# Patient Record
Sex: MALE | Race: WHITE | Employment: OTHER | URBAN - METROPOLITAN AREA
[De-identification: names, ages, dates, MRNs, and addresses within clinical notes are randomized per-mention and may not be internally consistent; named-entity substitution may affect disease eponyms.]

---

## 2017-02-21 ENCOUNTER — OFFICE VISIT (OUTPATIENT)
Dept: INTERNAL MEDICINE CLINIC | Facility: CLINIC | Age: 82
End: 2017-02-21

## 2017-02-21 VITALS
HEIGHT: 70 IN | TEMPERATURE: 98 F | BODY MASS INDEX: 31.92 KG/M2 | HEART RATE: 55 BPM | OXYGEN SATURATION: 97 % | SYSTOLIC BLOOD PRESSURE: 150 MMHG | DIASTOLIC BLOOD PRESSURE: 80 MMHG | RESPIRATION RATE: 18 BRPM | WEIGHT: 223 LBS

## 2017-02-21 DIAGNOSIS — I10 HTN, GOAL BELOW 130/80: ICD-10-CM

## 2017-02-21 DIAGNOSIS — Z87.39 HISTORY OF GOUT: ICD-10-CM

## 2017-02-21 DIAGNOSIS — E83.42 HYPOMAGNESEMIA: ICD-10-CM

## 2017-02-21 DIAGNOSIS — E11.9 DIABETES MELLITUS TYPE 2 IN NONOBESE (HCC): Primary | ICD-10-CM

## 2017-02-21 DIAGNOSIS — Z90.5 STATUS POST NEPHRECTOMY: ICD-10-CM

## 2017-02-21 DIAGNOSIS — H54.40 BLIND RIGHT EYE: ICD-10-CM

## 2017-02-21 DIAGNOSIS — E78.5 DYSLIPIDEMIA: ICD-10-CM

## 2017-02-21 LAB
EST. AVERAGE GLUCOSE BLD GHB EST-MCNC: 143 MG/DL (ref 68–126)
HAV IGM SER QL: 1.7 MG/DL (ref 1.7–3)
HBA1C MFR BLD HPLC: 6.6 % (ref ?–5.7)

## 2017-02-21 PROCEDURE — 83036 HEMOGLOBIN GLYCOSYLATED A1C: CPT | Performed by: INTERNAL MEDICINE

## 2017-02-21 PROCEDURE — G0439 PPPS, SUBSEQ VISIT: HCPCS | Performed by: INTERNAL MEDICINE

## 2017-02-21 PROCEDURE — 36415 COLL VENOUS BLD VENIPUNCTURE: CPT | Performed by: INTERNAL MEDICINE

## 2017-02-21 PROCEDURE — 83735 ASSAY OF MAGNESIUM: CPT | Performed by: INTERNAL MEDICINE

## 2017-02-21 RX ORDER — FENOFIBRATE 145 MG/1
145 TABLET, COATED ORAL DAILY
Qty: 90 TABLET | Refills: 3 | Status: SHIPPED | OUTPATIENT
Start: 2017-02-21 | End: 2018-03-29 | Stop reason: DRUGHIGH

## 2017-02-21 RX ORDER — ENALAPRIL MALEATE 5 MG/1
5 TABLET ORAL DAILY
Qty: 180 TABLET | Refills: 3 | Status: SHIPPED | OUTPATIENT
Start: 2017-02-21 | End: 2018-04-30

## 2017-02-21 NOTE — PATIENT INSTRUCTIONS
Deny Stanley's SCREENING SCHEDULE   Tests on this list are recommended by your physician but may not be covered, or covered at this frequency, by your insurer. Please check with your insurance carrier before scheduling to verify coverage.    PREVENTATIV Activity if applicable    Zoster (Not covered by Medicare Part B) No orders found for this or any previous visit. Update Immunization Activity if applicable    Deirdre Adams, we are increasing her blood pressure medication. Enalapril were taken 2.5 mg.   Now take 2

## 2017-02-21 NOTE — PROGRESS NOTES
Eulogio Chavez is a 80year old male who presents for a Medicare Annual Wellness visit.          Patient Care Team: Patient Care Team:  Aileen Barbour MD as PCP - General (Internal Medicine)    Patient Active Problem List:     HTN, goal below 130/80 Rfl: 3     Wt Readings from Last 6 Encounters:  02/21/17 : 223 lb  07/14/16 : 221 lb  01/05/16 : 217 lb  10/08/15 : 215 lb  09/23/15 : 216 lb 3.2 oz  09/08/15 : 216 lb    Body mass index is 32 kg/(m^2).       Lab Results  Component Value Date   GLU 98 07/14 aspirin?: Yes    Have you had any immunizations at another office such as Influenza, Hepatitis B, Tetanus, or Pneumococcal?: Yes     Functional Ability     Bathing or Showering: Able without help    Toileting: Able without help    Dressing: Able without he \"Flag\": Correct    Recall \"Tree\": Correct         PREVENTATIVE SERVICES  INDICATIONS AND SCHEDULE Internal Lab or Procedure External Lab or Procedure   Diabetes Screening      HbgA1C   Annually HEMOGLOBIN A1C (%)   Date Value   04/07/2015 6.3*   02/02/ Internal Lab or Procedure External Lab or Procedure   Annual Monitoring of Persistent     Medications (ACE/ARB, digoxin, diuretics)    Potassium  Annually POTASSIUM (mmol/L)   Date Value   07/14/2016 4.3   07/12/2014 4.1    No flowsheet data found.     Crea Test sugars once daily Disp: 100 each Rfl: 12   FreeStyle Lancets Does not apply Misc Test sugars once daily Disp: 100 each Rfl: 12   OMEPRAZOLE 20 MG OR CPDR Take  by mouth. One bid].   Disp:  Rfl:    TERAZOSIN HCL 10 MG OR CAPS 1 CAPSULE AT BEDTIME Disp: incontinence  MUSCULOSKELETAL: denies back pain  NEURO: denies headaches  PSYCHE: denies depression or anxiety  HEMATOLOGIC: denies hx of anemia  ENDOCRINE: denies thyroid history  ALL/ASTHMA: denies hx of allergy or asthma    EXAM:   /80 mmHg  Pulse Return office 1 month problem #3 dyslipidemia clinically stable #4 hypomagnesemia check level #5 artificial right eye #6 history of gout patient asymptomatic #7 status post nephrectomy secondary to complication of kidney stones  The patient indicates under

## 2017-03-31 ENCOUNTER — OFFICE VISIT (OUTPATIENT)
Dept: INTERNAL MEDICINE CLINIC | Facility: CLINIC | Age: 82
End: 2017-03-31

## 2017-03-31 VITALS
RESPIRATION RATE: 18 BRPM | BODY MASS INDEX: 32 KG/M2 | TEMPERATURE: 98 F | WEIGHT: 224.5 LBS | HEART RATE: 70 BPM | DIASTOLIC BLOOD PRESSURE: 66 MMHG | OXYGEN SATURATION: 96 % | SYSTOLIC BLOOD PRESSURE: 116 MMHG

## 2017-03-31 DIAGNOSIS — E78.5 DYSLIPIDEMIA: ICD-10-CM

## 2017-03-31 DIAGNOSIS — E11.9 DIABETES MELLITUS TYPE 2 IN NONOBESE (HCC): Primary | ICD-10-CM

## 2017-03-31 DIAGNOSIS — I10 HTN, GOAL BELOW 130/80: ICD-10-CM

## 2017-03-31 PROCEDURE — 99213 OFFICE O/P EST LOW 20 MIN: CPT | Performed by: INTERNAL MEDICINE

## 2017-03-31 RX ORDER — CLOBETASOL PROPIONATE 0.46 MG/ML
SOLUTION TOPICAL
Refills: 1 | COMMUNITY
Start: 2017-01-28 | End: 2018-11-06 | Stop reason: ALTCHOICE

## 2017-03-31 RX ORDER — POLYMYXIN B SULFATE AND TRIMETHOPRIM 1; 10000 MG/ML; [USP'U]/ML
SOLUTION OPHTHALMIC
Refills: 1 | COMMUNITY
Start: 2017-02-06 | End: 2018-03-29

## 2017-03-31 NOTE — PROGRESS NOTES
Patient is here follow up on his blood pressure. We did increase his blood pressure medication. He denies headaches dizziness chest pain or shortness of breath. Physical examination pleasant alert well orientated no acute distress.   Normocephalic yohannes

## 2017-04-24 ENCOUNTER — OFFICE VISIT (OUTPATIENT)
Dept: INTERNAL MEDICINE CLINIC | Facility: CLINIC | Age: 82
End: 2017-04-24

## 2017-04-24 VITALS
SYSTOLIC BLOOD PRESSURE: 140 MMHG | DIASTOLIC BLOOD PRESSURE: 72 MMHG | OXYGEN SATURATION: 97 % | BODY MASS INDEX: 32 KG/M2 | TEMPERATURE: 98 F | RESPIRATION RATE: 16 BRPM | HEART RATE: 58 BPM | WEIGHT: 223.63 LBS

## 2017-04-24 DIAGNOSIS — J06.9 URTI (ACUTE UPPER RESPIRATORY INFECTION): Primary | ICD-10-CM

## 2017-04-24 DIAGNOSIS — H10.022 PINK EYE, LEFT: ICD-10-CM

## 2017-04-24 PROCEDURE — 99213 OFFICE O/P EST LOW 20 MIN: CPT | Performed by: INTERNAL MEDICINE

## 2017-04-24 RX ORDER — ECHINACEA PURPUREA EXTRACT 125 MG
1 TABLET ORAL AS NEEDED
Qty: 1 BOTTLE | Refills: 0 | Status: SHIPPED | OUTPATIENT
Start: 2017-04-24 | End: 2018-03-29

## 2017-04-24 RX ORDER — CIPROFLOXACIN HYDROCHLORIDE 3.5 MG/ML
2 SOLUTION/ DROPS TOPICAL
Qty: 1 BOTTLE | Refills: 0 | Status: SHIPPED | OUTPATIENT
Start: 2017-04-24 | End: 2017-05-25 | Stop reason: ALTCHOICE

## 2017-04-24 RX ORDER — AMOXICILLIN 500 MG/1
500 CAPSULE ORAL 2 TIMES DAILY
Qty: 14 CAPSULE | Refills: 0 | Status: SHIPPED | OUTPATIENT
Start: 2017-04-24 | End: 2017-05-25 | Stop reason: ALTCHOICE

## 2017-04-24 NOTE — PROGRESS NOTES
For last 8 days Mikki's been complaining of pressure in his forehead head congestion. Denies sore throat earaches or fever. States left eye pink with morning discharge and itchy sensation.     Physical examination pleasant individual appears in no acute d

## 2017-05-25 ENCOUNTER — OFFICE VISIT (OUTPATIENT)
Dept: INTERNAL MEDICINE CLINIC | Facility: CLINIC | Age: 82
End: 2017-05-25

## 2017-05-25 VITALS
RESPIRATION RATE: 18 BRPM | DIASTOLIC BLOOD PRESSURE: 80 MMHG | TEMPERATURE: 97 F | OXYGEN SATURATION: 96 % | BODY MASS INDEX: 31 KG/M2 | HEART RATE: 62 BPM | SYSTOLIC BLOOD PRESSURE: 129 MMHG | WEIGHT: 217.81 LBS

## 2017-05-25 DIAGNOSIS — Z90.5 STATUS POST NEPHRECTOMY: ICD-10-CM

## 2017-05-25 DIAGNOSIS — E11.9 DIABETES MELLITUS TYPE 2 IN NONOBESE (HCC): Primary | ICD-10-CM

## 2017-05-25 DIAGNOSIS — E78.5 DYSLIPIDEMIA: ICD-10-CM

## 2017-05-25 DIAGNOSIS — I10 HTN, GOAL BELOW 130/80: ICD-10-CM

## 2017-05-25 PROCEDURE — 83036 HEMOGLOBIN GLYCOSYLATED A1C: CPT | Performed by: INTERNAL MEDICINE

## 2017-05-25 PROCEDURE — 80053 COMPREHEN METABOLIC PANEL: CPT | Performed by: INTERNAL MEDICINE

## 2017-05-25 PROCEDURE — 99214 OFFICE O/P EST MOD 30 MIN: CPT | Performed by: INTERNAL MEDICINE

## 2017-05-25 PROCEDURE — 85025 COMPLETE CBC W/AUTO DIFF WBC: CPT | Performed by: INTERNAL MEDICINE

## 2017-05-25 PROCEDURE — 80061 LIPID PANEL: CPT | Performed by: INTERNAL MEDICINE

## 2017-05-25 PROCEDURE — 36415 COLL VENOUS BLD VENIPUNCTURE: CPT | Performed by: INTERNAL MEDICINE

## 2017-05-25 NOTE — PROGRESS NOTES
Problem #1 diabetes mellitus. Patient denies polyuria polydipsia. Up-to-date on eye exam.  Does not measure home blood sugar. Denies any numbness or tingling in arms and legs.   Problem #2 hypertension patient denies headaches dizziness chest pain shortn

## 2017-05-26 DIAGNOSIS — N18.30 CKD (CHRONIC KIDNEY DISEASE) STAGE 3, GFR 30-59 ML/MIN (HCC): Primary | ICD-10-CM

## 2017-06-08 ENCOUNTER — OFFICE VISIT (OUTPATIENT)
Dept: INTERNAL MEDICINE CLINIC | Facility: CLINIC | Age: 82
End: 2017-06-08

## 2017-06-08 VITALS
SYSTOLIC BLOOD PRESSURE: 140 MMHG | OXYGEN SATURATION: 97 % | HEART RATE: 64 BPM | RESPIRATION RATE: 18 BRPM | BODY MASS INDEX: 32 KG/M2 | TEMPERATURE: 101 F | WEIGHT: 223.38 LBS | DIASTOLIC BLOOD PRESSURE: 60 MMHG

## 2017-06-08 DIAGNOSIS — J18.9 PNEUMONIA OF RIGHT MIDDLE LOBE DUE TO INFECTIOUS ORGANISM: Primary | ICD-10-CM

## 2017-06-08 PROCEDURE — 36415 COLL VENOUS BLD VENIPUNCTURE: CPT | Performed by: INTERNAL MEDICINE

## 2017-06-08 PROCEDURE — 99213 OFFICE O/P EST LOW 20 MIN: CPT | Performed by: INTERNAL MEDICINE

## 2017-06-08 RX ORDER — LEVOFLOXACIN 500 MG/1
500 TABLET, FILM COATED ORAL DAILY
Qty: 7 TABLET | Refills: 0 | Status: SHIPPED | OUTPATIENT
Start: 2017-06-08 | End: 2017-11-28 | Stop reason: ALTCHOICE

## 2017-06-08 NOTE — PATIENT INSTRUCTIONS
Problem if the situation deteriorates please call security have them evaluate you. I want to make an appointment to see me on Monday.

## 2017-06-12 ENCOUNTER — OFFICE VISIT (OUTPATIENT)
Dept: INTERNAL MEDICINE CLINIC | Facility: CLINIC | Age: 82
End: 2017-06-12

## 2017-06-12 VITALS
DIASTOLIC BLOOD PRESSURE: 70 MMHG | RESPIRATION RATE: 16 BRPM | HEART RATE: 60 BPM | BODY MASS INDEX: 32 KG/M2 | OXYGEN SATURATION: 97 % | WEIGHT: 220.63 LBS | SYSTOLIC BLOOD PRESSURE: 122 MMHG | TEMPERATURE: 98 F

## 2017-06-12 DIAGNOSIS — J18.9 PNEUMONIA OF RIGHT MIDDLE LOBE DUE TO INFECTIOUS ORGANISM: Primary | ICD-10-CM

## 2017-06-12 PROCEDURE — 99213 OFFICE O/P EST LOW 20 MIN: CPT | Performed by: INTERNAL MEDICINE

## 2017-06-12 NOTE — PROGRESS NOTES
Jerona Prader states for the first time today he feels better. Still coughing. No fever chills. Physical examination pleasant alert well orientated no acute distress. Lungs few crackles right bases. Skin warm and dry.     Impression pneumonia improved    Plan

## 2017-11-28 ENCOUNTER — OFFICE VISIT (OUTPATIENT)
Dept: INTERNAL MEDICINE CLINIC | Facility: CLINIC | Age: 82
End: 2017-11-28

## 2017-11-28 VITALS
RESPIRATION RATE: 18 BRPM | BODY MASS INDEX: 33 KG/M2 | WEIGHT: 221 LBS | OXYGEN SATURATION: 96 % | DIASTOLIC BLOOD PRESSURE: 80 MMHG | HEART RATE: 56 BPM | SYSTOLIC BLOOD PRESSURE: 153 MMHG | TEMPERATURE: 97 F

## 2017-11-28 DIAGNOSIS — T16.1XXA FOREIGN BODY OF RIGHT EAR, INITIAL ENCOUNTER: ICD-10-CM

## 2017-11-28 DIAGNOSIS — E78.5 DYSLIPIDEMIA: ICD-10-CM

## 2017-11-28 DIAGNOSIS — I10 HTN, GOAL BELOW 130/80: ICD-10-CM

## 2017-11-28 DIAGNOSIS — E11.22 TYPE 2 DIABETES MELLITUS WITH STAGE 3 CHRONIC KIDNEY DISEASE, WITHOUT LONG-TERM CURRENT USE OF INSULIN (HCC): ICD-10-CM

## 2017-11-28 DIAGNOSIS — N18.30 TYPE 2 DIABETES MELLITUS WITH STAGE 3 CHRONIC KIDNEY DISEASE, WITHOUT LONG-TERM CURRENT USE OF INSULIN (HCC): ICD-10-CM

## 2017-11-28 DIAGNOSIS — J01.11 ACUTE RECURRENT FRONTAL SINUSITIS: Primary | ICD-10-CM

## 2017-11-28 DIAGNOSIS — E83.42 HYPOMAGNESEMIA: ICD-10-CM

## 2017-11-28 DIAGNOSIS — E11.9 DIABETES MELLITUS TYPE 2 IN NONOBESE (HCC): ICD-10-CM

## 2017-11-28 PROCEDURE — 80061 LIPID PANEL: CPT | Performed by: INTERNAL MEDICINE

## 2017-11-28 PROCEDURE — 80053 COMPREHEN METABOLIC PANEL: CPT | Performed by: INTERNAL MEDICINE

## 2017-11-28 PROCEDURE — 99214 OFFICE O/P EST MOD 30 MIN: CPT | Performed by: INTERNAL MEDICINE

## 2017-11-28 PROCEDURE — 83036 HEMOGLOBIN GLYCOSYLATED A1C: CPT | Performed by: INTERNAL MEDICINE

## 2017-11-28 PROCEDURE — 83735 ASSAY OF MAGNESIUM: CPT | Performed by: INTERNAL MEDICINE

## 2017-11-28 PROCEDURE — 85025 COMPLETE CBC W/AUTO DIFF WBC: CPT | Performed by: INTERNAL MEDICINE

## 2017-11-28 PROCEDURE — 36415 COLL VENOUS BLD VENIPUNCTURE: CPT | Performed by: INTERNAL MEDICINE

## 2017-11-28 RX ORDER — SULFAMETHOXAZOLE AND TRIMETHOPRIM 800; 160 MG/1; MG/1
1 TABLET ORAL 2 TIMES DAILY
Qty: 14 TABLET | Refills: 0 | Status: SHIPPED | OUTPATIENT
Start: 2017-11-28 | End: 2018-01-03 | Stop reason: ALTCHOICE

## 2017-11-28 NOTE — PATIENT INSTRUCTIONS
Alvaro Huntley to check her blood pressure daily write the numbers down and make an appointment see me one month and bring those numbers with you.   We did give her referral to see a kidney specialist and also an ear nose and throat specialist to remove poss

## 2017-11-28 NOTE — PROGRESS NOTES
Problem 1 hypertension patient denies headaches dizziness chest pain or shortness of breath.   He did not take his blood pressure pills today problem #2 dyslipidemia denies abdominal pain no muscle aches and pains problem #3 diabetes mellitus with chronic k

## 2017-12-20 ENCOUNTER — OFFICE VISIT (OUTPATIENT)
Dept: NEPHROLOGY | Facility: CLINIC | Age: 82
End: 2017-12-20

## 2017-12-20 VITALS — BODY MASS INDEX: 33 KG/M2 | SYSTOLIC BLOOD PRESSURE: 156 MMHG | DIASTOLIC BLOOD PRESSURE: 82 MMHG | WEIGHT: 222 LBS

## 2017-12-20 DIAGNOSIS — I10 ESSENTIAL HYPERTENSION: ICD-10-CM

## 2017-12-20 DIAGNOSIS — N18.30 CKD (CHRONIC KIDNEY DISEASE) STAGE 3, GFR 30-59 ML/MIN (HCC): Primary | ICD-10-CM

## 2017-12-20 PROCEDURE — 99204 OFFICE O/P NEW MOD 45 MIN: CPT | Performed by: INTERNAL MEDICINE

## 2017-12-20 NOTE — PROGRESS NOTES
Nephrology Consult Note    REASON FOR CONSULT: CKD 3    ASSESSMENT/PLAN:        1) CKD 3- agree that slowly rising serum creatinine from 1.4 -> 1.6 mg/dl since 2011 is due to a combination of hypertensive and age-related nephrosclerosis superimposed on a s acute or chronic renal failure gross microscopic hematuria proteinuria or kidney infections. No history of cardiac pulmonary or hepatic disease.     ROS:    Denies fever/chills  Denies wt loss/gain  Denies HA or visual changes  Denies CP or palpitations  D spray by Nasal route as needed for congestion.  Disp: 1 Bottle Rfl: 0   Clobetasol Propionate 0.05 % External Solution APPLY TO ITCHY AREAS OF SCALP BID FOR UP TO 2 WEEKS THEN PRN FOR FLARES Disp:  Rfl: 1   Polymyxin B-Trimethoprim 58413-4.1 UNIT/ML-% Ophth distress. HEENT: No scleral icterus, MMM  Neck: Supple, no EDITA or thyromegaly  Cardiac: Regular rate and rhythm, S1, S2 normal, no murmur or rub  Lungs: Clear without wheezes, rales, rhonchi.     Abdomen: Soft, non-tender. + bowel sounds, no palpable organ

## 2017-12-21 ENCOUNTER — OFFICE VISIT (OUTPATIENT)
Dept: INTERNAL MEDICINE CLINIC | Facility: CLINIC | Age: 82
End: 2017-12-21

## 2017-12-21 VITALS
OXYGEN SATURATION: 97 % | DIASTOLIC BLOOD PRESSURE: 80 MMHG | HEART RATE: 60 BPM | WEIGHT: 221.5 LBS | BODY MASS INDEX: 33 KG/M2 | SYSTOLIC BLOOD PRESSURE: 144 MMHG | TEMPERATURE: 97 F | RESPIRATION RATE: 18 BRPM

## 2017-12-21 DIAGNOSIS — I10 HTN, GOAL BELOW 130/80: Primary | ICD-10-CM

## 2017-12-21 DIAGNOSIS — N18.30 CKD (CHRONIC KIDNEY DISEASE) STAGE 3, GFR 30-59 ML/MIN (HCC): ICD-10-CM

## 2017-12-21 DIAGNOSIS — E78.1 HIGH TRIGLYCERIDES: ICD-10-CM

## 2017-12-21 PROCEDURE — 99214 OFFICE O/P EST MOD 30 MIN: CPT | Performed by: INTERNAL MEDICINE

## 2017-12-21 NOTE — PROGRESS NOTES
Problem #1 hypertension controlled denies any headaches dizziness chest pain shortness of breath. He has been taking multiple blood pressure readings and just 5 of them have been elevated in the 740 range systolic.   Malu Caballero however refuses to take more medic

## 2018-01-03 ENCOUNTER — OFFICE VISIT (OUTPATIENT)
Dept: INTERNAL MEDICINE CLINIC | Facility: CLINIC | Age: 83
End: 2018-01-03

## 2018-01-03 VITALS
OXYGEN SATURATION: 95 % | HEIGHT: 69.5 IN | BODY MASS INDEX: 31.92 KG/M2 | TEMPERATURE: 98 F | SYSTOLIC BLOOD PRESSURE: 122 MMHG | RESPIRATION RATE: 16 BRPM | HEART RATE: 72 BPM | WEIGHT: 220.44 LBS | DIASTOLIC BLOOD PRESSURE: 60 MMHG

## 2018-01-03 DIAGNOSIS — R10.11 RIGHT UPPER QUADRANT ABDOMINAL PAIN: Primary | ICD-10-CM

## 2018-01-03 PROCEDURE — 99214 OFFICE O/P EST MOD 30 MIN: CPT | Performed by: INTERNAL MEDICINE

## 2018-01-03 NOTE — PROGRESS NOTES
Because complaining of what he calls at times pain is at times discomfort right upper quadrant. Intermittent pain no known aggravating or relieving events. May last an hour sometimes it may last all day.   States appetite is good bowel movements normal no

## 2018-02-12 ENCOUNTER — OFFICE VISIT (OUTPATIENT)
Dept: INTERNAL MEDICINE CLINIC | Facility: CLINIC | Age: 83
End: 2018-02-12

## 2018-02-12 VITALS
DIASTOLIC BLOOD PRESSURE: 60 MMHG | HEART RATE: 61 BPM | TEMPERATURE: 99 F | BODY MASS INDEX: 32 KG/M2 | SYSTOLIC BLOOD PRESSURE: 126 MMHG | OXYGEN SATURATION: 96 % | WEIGHT: 223 LBS | RESPIRATION RATE: 16 BRPM

## 2018-02-12 DIAGNOSIS — B34.9 VIRAL SYNDROME: ICD-10-CM

## 2018-02-12 PROCEDURE — 99213 OFFICE O/P EST LOW 20 MIN: CPT | Performed by: INTERNAL MEDICINE

## 2018-02-12 NOTE — PATIENT INSTRUCTIONS
[place] Diet  Your healthcare provider may recommend a bland diet if you have an upset stomach. It consists of foods that are mild and easy to digest. It is better to eat small frequent meals rather than 3 large meals a day.     Beverages  OK: Fruit juices, © 3905-3640 [employer] [address]. [address] [facility] [place], [facility] [place]. All rights reserved. This information is not intended as a substitute for professional medical care. Always follow your healthcare professional's instructions.       Antonio etienne

## 2018-02-12 NOTE — PROGRESS NOTES
Malu Caballero not been feeling well last couple days. Yesterday started with nausea vomiting and diarrhea today just has diarrhea. States he is better no fever chills. No cough shortness of breath or wheezing.     Physical examination pleasant individual in no ac

## 2018-03-29 ENCOUNTER — OFFICE VISIT (OUTPATIENT)
Dept: INTERNAL MEDICINE CLINIC | Facility: CLINIC | Age: 83
End: 2018-03-29

## 2018-03-29 VITALS
RESPIRATION RATE: 18 BRPM | TEMPERATURE: 97 F | HEART RATE: 56 BPM | SYSTOLIC BLOOD PRESSURE: 145 MMHG | OXYGEN SATURATION: 96 % | BODY MASS INDEX: 32 KG/M2 | WEIGHT: 223.13 LBS | DIASTOLIC BLOOD PRESSURE: 80 MMHG

## 2018-03-29 DIAGNOSIS — J22 LRTI (LOWER RESPIRATORY TRACT INFECTION): Primary | ICD-10-CM

## 2018-03-29 DIAGNOSIS — I10 ELEVATED BLOOD PRESSURE READING IN OFFICE WITH DIAGNOSIS OF HYPERTENSION: ICD-10-CM

## 2018-03-29 PROCEDURE — 99214 OFFICE O/P EST MOD 30 MIN: CPT | Performed by: INTERNAL MEDICINE

## 2018-03-29 RX ORDER — AZITHROMYCIN 500 MG/1
500 TABLET, FILM COATED ORAL DAILY
Qty: 1 TABLET | Refills: 0 | Status: SHIPPED | OUTPATIENT
Start: 2018-03-29 | End: 2018-05-14 | Stop reason: ALTCHOICE

## 2018-03-29 RX ORDER — IPRATROPIUM BROMIDE 42 UG/1
2 SPRAY, METERED NASAL 2 TIMES DAILY PRN
COMMUNITY

## 2018-03-29 RX ORDER — FENOFIBRATE 145 MG/1
145 TABLET, COATED ORAL DAILY
COMMUNITY
End: 2018-05-30

## 2018-03-29 NOTE — PATIENT INSTRUCTIONS
Ara Hernandez want to check her blood pressure daily write those numbers down and make appointment in 1 month and bring those numbers with you.

## 2018-03-29 NOTE — PROGRESS NOTES
Patient's been complaining of chest congestion cough for over a week. No headaches dizziness chest pain shortness of breath. Physical examination pleasant individual in no acute distress. Normocephalic nonicteric.   Carotids 2+ no bruits no thyromegaly

## 2018-04-30 ENCOUNTER — OFFICE VISIT (OUTPATIENT)
Dept: INTERNAL MEDICINE CLINIC | Facility: CLINIC | Age: 83
End: 2018-04-30

## 2018-04-30 VITALS
HEART RATE: 66 BPM | WEIGHT: 223.63 LBS | HEIGHT: 69.5 IN | BODY MASS INDEX: 32.38 KG/M2 | SYSTOLIC BLOOD PRESSURE: 160 MMHG | DIASTOLIC BLOOD PRESSURE: 80 MMHG | TEMPERATURE: 98 F | OXYGEN SATURATION: 97 % | RESPIRATION RATE: 16 BRPM

## 2018-04-30 DIAGNOSIS — E11.69 DM TYPE 2 WITH DIABETIC DYSLIPIDEMIA (HCC): ICD-10-CM

## 2018-04-30 DIAGNOSIS — J06.9 VIRAL UPPER RESPIRATORY TRACT INFECTION: ICD-10-CM

## 2018-04-30 DIAGNOSIS — E11.22 TYPE 2 DIABETES MELLITUS WITH STAGE 3 CHRONIC KIDNEY DISEASE, WITHOUT LONG-TERM CURRENT USE OF INSULIN (HCC): ICD-10-CM

## 2018-04-30 DIAGNOSIS — Z90.5 STATUS POST NEPHRECTOMY: ICD-10-CM

## 2018-04-30 DIAGNOSIS — I10 HTN, GOAL BELOW 130/80: ICD-10-CM

## 2018-04-30 DIAGNOSIS — E83.42 HYPOMAGNESEMIA: ICD-10-CM

## 2018-04-30 DIAGNOSIS — E11.9 DIABETES MELLITUS TYPE 2 IN NONOBESE (HCC): Primary | ICD-10-CM

## 2018-04-30 DIAGNOSIS — Z00.00 ENCOUNTER FOR ANNUAL HEALTH EXAMINATION: ICD-10-CM

## 2018-04-30 DIAGNOSIS — E78.5 DM TYPE 2 WITH DIABETIC DYSLIPIDEMIA (HCC): ICD-10-CM

## 2018-04-30 DIAGNOSIS — N18.30 TYPE 2 DIABETES MELLITUS WITH STAGE 3 CHRONIC KIDNEY DISEASE, WITHOUT LONG-TERM CURRENT USE OF INSULIN (HCC): ICD-10-CM

## 2018-04-30 DIAGNOSIS — H54.40 BLIND RIGHT EYE: ICD-10-CM

## 2018-04-30 PROBLEM — E78.1 HIGH TRIGLYCERIDES: Status: RESOLVED | Noted: 2017-12-21 | Resolved: 2018-04-30

## 2018-04-30 PROCEDURE — 36415 COLL VENOUS BLD VENIPUNCTURE: CPT | Performed by: INTERNAL MEDICINE

## 2018-04-30 PROCEDURE — G0439 PPPS, SUBSEQ VISIT: HCPCS | Performed by: INTERNAL MEDICINE

## 2018-04-30 PROCEDURE — 93000 ELECTROCARDIOGRAM COMPLETE: CPT | Performed by: INTERNAL MEDICINE

## 2018-04-30 PROCEDURE — 83735 ASSAY OF MAGNESIUM: CPT | Performed by: INTERNAL MEDICINE

## 2018-04-30 PROCEDURE — 83036 HEMOGLOBIN GLYCOSYLATED A1C: CPT | Performed by: INTERNAL MEDICINE

## 2018-04-30 RX ORDER — ENALAPRIL MALEATE 10 MG/1
10 TABLET ORAL DAILY
Qty: 180 TABLET | Refills: 3 | Status: SHIPPED | OUTPATIENT
Start: 2018-04-30 | End: 2018-11-16

## 2018-04-30 NOTE — PROGRESS NOTES
HPI:   Padma Corado is a 80year old male who presents for a Medicare Subsequent Annual Wellness visit (Pt already had Initial Annual Wellness).     URI  His last annual assessment has been over 1 year: Annual Physical due on 02/21/2018         Fall/Risk A General (Internal Medicine)    Patient Active Problem List:     HTN, goal below 130/80     Diabetes mellitus type 2 in nonobese [facility])     Blind right eye     Status post nephrectomy left complication  stone     Type 2 diabetes mellitus with stage 3 chronic PM)   aspirin 81 MG Oral Tab Take two tablets nightly    Psyllium (METAMUCIL OR) 2 (two) times daily.    Glucose Blood (FREESTYLE INSULINX TEST) In Vitro Strip Test sugars once daily   FreeStyle Lancets Does not apply Misc Test sugars once daily   OMEPRAZOL Ht 69.5\"   Wt 223 lb 9.6 oz   SpO2 97%   BMI 32.55 kg/m²   Estimated body mass index is 32.55 kg/m² as calculated from the following:    Height as of this encounter: 69.5\". Weight as of this encounter: 223 lb 9.6 oz.     Medicare Hearing Assessment  ( • Pneumovax 23 02/06/2009   • TDAP 03/21/2013   • Zoster Vaccine Live (Zostavax) 02/06/2009        ASSESSMENT AND OTHER RELEVANT CHRONIC CONDITIONS:   Anusha Snow is a 80year old male who presents for a Medicare Assessment.      PLAN SUMMARY:   Diagnos Date Value   04/07/2015 6.3 (H)     HgbA1C (%)   Date Value   11/28/2017 6.3 (H)       No flowsheet data found.     Fasting Blood Sugar (FSB)Annually   Glucose (mg/dL)   Date Value   11/28/2017 101 (H)   04/07/2015 123 (H)   ----------    Cardiovascular D covered with a cut with metal- TD and TDaP Not covered by Medicare Part B) No vaccine history found This may be covered with your prescription benefits, but Medicare does not cover unless Medically needed    Zoster   Not covered by Medicare Part B No vacci

## 2018-04-30 NOTE — PATIENT INSTRUCTIONS
Jose Woo work and increase your enalapril 5 mg twice a day to 10 mg twice a day. You currently have 5 mg tablets to take 2 tablets twice a day. We will give you a prescription for 10 mg twice a day he could take to the [place].   Also regarding your respiratory tr 11/28/2017 55     CHOLESTEROL, TOTAL (mg/dL)   Date Value   07/11/2014 119     Cholesterol, Total (mg/dL)   Date Value   11/28/2017 105     Triglycerides (mg/dL)   Date Value   11/28/2017 100        EKG - covered if needed at Welcome to Medicare, and non 09/29/14  -FLU VAC NO PRSV 4 JONATHAN 3 YRS+    Please get every year    Pneumococcal 13 (Prevnar)  Covered Once after 65   Orders placed or performed in visit on 12/23/14  -PNEUMOCOCCAL VACC, 13 JONATHAN IM    Please get once after your 65th birthday    Jessy Sinclair

## 2018-05-14 ENCOUNTER — OFFICE VISIT (OUTPATIENT)
Dept: INTERNAL MEDICINE CLINIC | Facility: CLINIC | Age: 83
End: 2018-05-14

## 2018-05-14 VITALS
SYSTOLIC BLOOD PRESSURE: 138 MMHG | WEIGHT: 215.88 LBS | RESPIRATION RATE: 16 BRPM | TEMPERATURE: 98 F | OXYGEN SATURATION: 96 % | BODY MASS INDEX: 31 KG/M2 | HEART RATE: 63 BPM | DIASTOLIC BLOOD PRESSURE: 60 MMHG

## 2018-05-14 DIAGNOSIS — J01.10 ACUTE NON-RECURRENT FRONTAL SINUSITIS: Primary | ICD-10-CM

## 2018-05-14 PROCEDURE — 99213 OFFICE O/P EST LOW 20 MIN: CPT | Performed by: INTERNAL MEDICINE

## 2018-05-14 RX ORDER — AMOXICILLIN AND CLAVULANATE POTASSIUM 500; 125 MG/1; MG/1
1 TABLET, FILM COATED ORAL 3 TIMES DAILY
Qty: 21 TABLET | Refills: 0 | Status: SHIPPED | OUTPATIENT
Start: 2018-05-14 | End: 2018-05-30

## 2018-05-14 RX ORDER — SACCHAROMYCES BOULARDII 250 MG
250 CAPSULE ORAL 2 TIMES DAILY
Qty: 60 CAPSULE | Refills: 0 | Status: SHIPPED | OUTPATIENT
Start: 2018-05-14 | End: 2020-03-04

## 2018-05-14 NOTE — PATIENT INSTRUCTIONS
Licha Graft usually steam heat at least 3-4 times a day to loosen up at congestion or sinus. If any point in time symptoms deteriorate come down see us in after hours called EMTs.

## 2018-05-14 NOTE — PROGRESS NOTES
For last couple weeks Tone's had symptoms of sinus infection. He was on Z-Ayan with no benefit. He states he has seen a ENT recently and they were able to help him. He is not interested in second visit with an ENT.   He does complain of feeling tired fati

## 2018-05-21 ENCOUNTER — OFFICE VISIT (OUTPATIENT)
Dept: INTERNAL MEDICINE CLINIC | Facility: CLINIC | Age: 83
End: 2018-05-21

## 2018-05-21 VITALS
HEART RATE: 51 BPM | BODY MASS INDEX: 32 KG/M2 | RESPIRATION RATE: 16 BRPM | OXYGEN SATURATION: 98 % | DIASTOLIC BLOOD PRESSURE: 70 MMHG | TEMPERATURE: 98 F | SYSTOLIC BLOOD PRESSURE: 138 MMHG | WEIGHT: 217.69 LBS

## 2018-05-21 DIAGNOSIS — J01.10 ACUTE NON-RECURRENT FRONTAL SINUSITIS: ICD-10-CM

## 2018-05-21 DIAGNOSIS — B00.9 HERPES SIMPLEX: ICD-10-CM

## 2018-05-21 DIAGNOSIS — R05.9 COUGH: Primary | ICD-10-CM

## 2018-05-21 PROCEDURE — 99214 OFFICE O/P EST MOD 30 MIN: CPT | Performed by: INTERNAL MEDICINE

## 2018-05-21 RX ORDER — AMOXICILLIN AND CLAVULANATE POTASSIUM 500; 125 MG/1; MG/1
1 TABLET, FILM COATED ORAL 3 TIMES DAILY
Qty: 10 TABLET | Refills: 0 | Status: SHIPPED | OUTPATIENT
Start: 2018-05-21 | End: 2018-05-30

## 2018-05-21 NOTE — PROGRESS NOTES
Problem #1 sinusitis. Is completing a week's worth of Augmentin. Feels better but not 100%. No complaint of blisters in his lips. Does have history of symptom complex herpes. Also complains of dry cough. No fever chills or night sweats.   Patient did

## 2018-05-21 NOTE — PATIENT INSTRUCTIONS
Raymond Art we did order chest x-ray for you. We were unable to do the CT scan to check for possible lung cancer because her age.   Also wants to continue using the steam.  And I will see you morning

## 2018-05-23 ENCOUNTER — HOSPITAL ENCOUNTER (OUTPATIENT)
Dept: GENERAL RADIOLOGY | Age: 83
Discharge: HOME | End: 2018-05-23
Attending: INTERNAL MEDICINE
Payer: MEDICARE

## 2018-05-23 DIAGNOSIS — R05.9 COUGH: ICD-10-CM

## 2018-05-23 PROCEDURE — 71046 X-RAY EXAM CHEST 2 VIEWS: CPT | Performed by: INTERNAL MEDICINE

## 2018-05-24 NOTE — PROGRESS NOTES
Printed and placed in patients [facility].  V/O Dr Pietro Medina

## 2018-05-30 ENCOUNTER — OFFICE VISIT (OUTPATIENT)
Dept: INTERNAL MEDICINE CLINIC | Facility: CLINIC | Age: 83
End: 2018-05-30

## 2018-05-30 VITALS
TEMPERATURE: 98 F | RESPIRATION RATE: 18 BRPM | DIASTOLIC BLOOD PRESSURE: 78 MMHG | SYSTOLIC BLOOD PRESSURE: 134 MMHG | OXYGEN SATURATION: 96 % | BODY MASS INDEX: 32 KG/M2 | HEART RATE: 58 BPM | WEIGHT: 217.19 LBS

## 2018-05-30 DIAGNOSIS — J22 LRTI (LOWER RESPIRATORY TRACT INFECTION): Primary | ICD-10-CM

## 2018-05-30 DIAGNOSIS — E78.5 DM TYPE 2 WITH DIABETIC DYSLIPIDEMIA (HCC): ICD-10-CM

## 2018-05-30 DIAGNOSIS — E11.69 DM TYPE 2 WITH DIABETIC DYSLIPIDEMIA (HCC): ICD-10-CM

## 2018-05-30 PROCEDURE — 99213 OFFICE O/P EST LOW 20 MIN: CPT | Performed by: INTERNAL MEDICINE

## 2018-05-30 RX ORDER — FENOFIBRATE 145 MG/1
145 TABLET, COATED ORAL DAILY
Qty: 90 TABLET | Refills: 1 | Status: ON HOLD | OUTPATIENT
Start: 2018-05-30 | End: 2019-10-18

## 2018-05-30 NOTE — PROGRESS NOTES
Kristal Burt is here to follow-up on his lower respiratory tract infection. He has a long history of chemical exposures and his frequent sinus is respiratory tract infection. He now states after a course of Augmentin feels much better.   No cough shortness of lisha

## 2018-08-21 ENCOUNTER — TELEPHONE (OUTPATIENT)
Dept: INTERNAL MEDICINE CLINIC | Facility: CLINIC | Age: 83
End: 2018-08-21

## 2018-08-21 RX ORDER — AZITHROMYCIN 250 MG/1
TABLET, FILM COATED ORAL
Qty: 6 TABLET | Refills: 0 | Status: SHIPPED | OUTPATIENT
Start: 2018-08-21 | End: 2018-11-06 | Stop reason: ALTCHOICE

## 2018-11-06 ENCOUNTER — OFFICE VISIT (OUTPATIENT)
Dept: INTERNAL MEDICINE CLINIC | Facility: CLINIC | Age: 83
End: 2018-11-06
Payer: MEDICARE

## 2018-11-06 VITALS
HEIGHT: 69.24 IN | WEIGHT: 217.63 LBS | SYSTOLIC BLOOD PRESSURE: 156 MMHG | DIASTOLIC BLOOD PRESSURE: 72 MMHG | BODY MASS INDEX: 31.87 KG/M2 | HEART RATE: 73 BPM | OXYGEN SATURATION: 97 % | RESPIRATION RATE: 16 BRPM

## 2018-11-06 DIAGNOSIS — N52.9 ERECTILE DYSFUNCTION, UNSPECIFIED ERECTILE DYSFUNCTION TYPE: ICD-10-CM

## 2018-11-06 DIAGNOSIS — I10 ELEVATED BLOOD PRESSURE READING IN OFFICE WITH DIAGNOSIS OF HYPERTENSION: Primary | ICD-10-CM

## 2018-11-06 DIAGNOSIS — E66.3 PATIENT OVERWEIGHT: ICD-10-CM

## 2018-11-06 PROCEDURE — 99214 OFFICE O/P EST MOD 30 MIN: CPT | Performed by: INTERNAL MEDICINE

## 2018-11-06 RX ORDER — MAGNESIUM OXIDE 420 MG
1 TABLET ORAL 2 TIMES DAILY
COMMUNITY

## 2018-11-06 RX ORDER — SILDENAFIL 50 MG/1
50 TABLET, FILM COATED ORAL
Qty: 8 TABLET | Refills: 11 | Status: ON HOLD | OUTPATIENT
Start: 2018-11-06 | End: 2019-10-18

## 2018-11-06 NOTE — PATIENT INSTRUCTIONS
Tone increase your enalapril from 10 mg daily to 20 mg daily. Continue checking and writing down your blood pressure once a day.   Bring those numbers with you in 1 month

## 2018-11-06 NOTE — PROGRESS NOTES
Problem 1 hypertension. Patient denies headaches dizziness chest pain or shortness of breath. Home readings were reviewed and mostly were elevated. Problem #2 overweight. Patient has been losing weight.   He is cut back on in fact he has eliminated ann-marie

## 2018-11-16 ENCOUNTER — TELEPHONE (OUTPATIENT)
Dept: INTERNAL MEDICINE CLINIC | Facility: CLINIC | Age: 83
End: 2018-11-16

## 2018-11-16 RX ORDER — VALACYCLOVIR HYDROCHLORIDE 1 G/1
1 TABLET, FILM COATED ORAL EVERY 12 HOURS SCHEDULED
Qty: 14 TABLET | Status: SHIPPED | OUTPATIENT
Start: 2018-11-16 | End: 2018-11-23

## 2018-11-16 RX ORDER — ENALAPRIL MALEATE 20 MG/1
20 TABLET ORAL 2 TIMES DAILY
Qty: 20 TABLET | Refills: 0 | Status: ON HOLD | OUTPATIENT
Start: 2018-11-16 | End: 2019-10-18

## 2018-12-13 ENCOUNTER — OFFICE VISIT (OUTPATIENT)
Dept: INTERNAL MEDICINE CLINIC | Facility: CLINIC | Age: 83
End: 2018-12-13
Payer: MEDICARE

## 2018-12-13 VITALS
BODY MASS INDEX: 31 KG/M2 | DIASTOLIC BLOOD PRESSURE: 66 MMHG | WEIGHT: 209.31 LBS | HEART RATE: 61 BPM | RESPIRATION RATE: 18 BRPM | TEMPERATURE: 97 F | OXYGEN SATURATION: 97 % | SYSTOLIC BLOOD PRESSURE: 134 MMHG

## 2018-12-13 DIAGNOSIS — R63.4 WEIGHT LOSS: ICD-10-CM

## 2018-12-13 DIAGNOSIS — I10 ESSENTIAL HYPERTENSION: Primary | ICD-10-CM

## 2018-12-13 DIAGNOSIS — M25.562 ACUTE PAIN OF LEFT KNEE: ICD-10-CM

## 2018-12-13 PROCEDURE — 99214 OFFICE O/P EST MOD 30 MIN: CPT | Performed by: INTERNAL MEDICINE

## 2018-12-13 NOTE — PROGRESS NOTES
Problem 1 hypertension much improved. We did increase his lisinopril from 10-20 mg. He denies any chest pain shortness of breath. Patient says he has lost some weight however he states he has cut back on snacking.   We did inform him that if he continues

## 2019-02-07 ENCOUNTER — NURSE ONLY (OUTPATIENT)
Dept: INTERNAL MEDICINE CLINIC | Facility: CLINIC | Age: 84
End: 2019-02-07
Payer: MEDICARE

## 2019-02-07 ENCOUNTER — LAB ENCOUNTER (OUTPATIENT)
Dept: LAB | Age: 84
End: 2019-02-07
Attending: FAMILY MEDICINE
Payer: MEDICARE

## 2019-02-07 DIAGNOSIS — R53.81 CHRONIC FATIGUE AND MALAISE: Primary | ICD-10-CM

## 2019-02-07 DIAGNOSIS — R53.81 MALAISE AND FATIGUE: Primary | ICD-10-CM

## 2019-02-07 DIAGNOSIS — R53.82 CHRONIC FATIGUE AND MALAISE: Primary | ICD-10-CM

## 2019-02-07 DIAGNOSIS — M89.9 DISEASE OF BONE: ICD-10-CM

## 2019-02-07 DIAGNOSIS — R53.83 MALAISE AND FATIGUE: Primary | ICD-10-CM

## 2019-02-07 DIAGNOSIS — M89.9 DISORDER OF BONE: ICD-10-CM

## 2019-02-07 LAB
BASOPHILS # BLD AUTO: 0.02 X10(3) UL (ref 0–0.2)
BASOPHILS NFR BLD AUTO: 0.4 %
DEPRECATED RDW RBC AUTO: 45.5 FL (ref 35.1–46.3)
EOSINOPHIL # BLD AUTO: 0.3 X10(3) UL (ref 0–0.7)
EOSINOPHIL NFR BLD AUTO: 6.3 %
ERYTHROCYTE [DISTWIDTH] IN BLOOD BY AUTOMATED COUNT: 13.6 % (ref 11–15)
HCT VFR BLD AUTO: 41.7 % (ref 39–53)
HGB BLD-MCNC: 13.9 G/DL (ref 13–17.5)
IMM GRANULOCYTES # BLD AUTO: 0.01 X10(3) UL (ref 0–1)
IMM GRANULOCYTES NFR BLD: 0.2 %
LYMPHOCYTES # BLD AUTO: 1.06 X10(3) UL (ref 1–4)
LYMPHOCYTES NFR BLD AUTO: 22.2 %
MCH RBC QN AUTO: 30.4 PG (ref 26–34)
MCHC RBC AUTO-ENTMCNC: 33.3 G/DL (ref 31–37)
MCV RBC AUTO: 91.2 FL (ref 80–100)
MONOCYTES # BLD AUTO: 0.68 X10(3) UL (ref 0.1–1)
MONOCYTES NFR BLD AUTO: 14.2 %
NEUTROPHILS # BLD AUTO: 2.71 X10 (3) UL (ref 1.5–7.7)
NEUTROPHILS # BLD AUTO: 2.71 X10(3) UL (ref 1.5–7.7)
NEUTROPHILS NFR BLD AUTO: 56.7 %
PLATELET # BLD AUTO: 227 10(3)UL (ref 150–450)
RBC # BLD AUTO: 4.57 X10(6)UL (ref 3.8–5.8)
VIT D+METAB SERPL-MCNC: 47 NG/ML (ref 30–100)
WBC # BLD AUTO: 4.8 X10(3) UL (ref 4–11)

## 2019-02-07 PROCEDURE — 36415 COLL VENOUS BLD VENIPUNCTURE: CPT | Performed by: INTERNAL MEDICINE

## 2019-02-07 PROCEDURE — 85025 COMPLETE CBC W/AUTO DIFF WBC: CPT

## 2019-02-07 PROCEDURE — 82306 VITAMIN D 25 HYDROXY: CPT

## 2019-02-08 NOTE — TELEPHONE ENCOUNTER
Patient informed that Dr. Elva Hancock would like to see the patient before refilling refill request.    Patient states that he was seen on 12/13/18 and has a 3 month follow up appt on 3/14/19 as recommended. Future appt:     Your appointments     Date & Time Rohan

## 2019-03-14 ENCOUNTER — OFFICE VISIT (OUTPATIENT)
Dept: INTERNAL MEDICINE CLINIC | Facility: CLINIC | Age: 84
End: 2019-03-14
Payer: MEDICARE

## 2019-03-14 VITALS
BODY MASS INDEX: 29.86 KG/M2 | RESPIRATION RATE: 17 BRPM | HEART RATE: 78 BPM | TEMPERATURE: 98 F | DIASTOLIC BLOOD PRESSURE: 64 MMHG | HEIGHT: 69.24 IN | OXYGEN SATURATION: 95 % | WEIGHT: 203.88 LBS | SYSTOLIC BLOOD PRESSURE: 134 MMHG

## 2019-03-14 DIAGNOSIS — I10 ESSENTIAL HYPERTENSION: ICD-10-CM

## 2019-03-14 DIAGNOSIS — E11.9 DIABETES MELLITUS TYPE 2 IN NONOBESE (HCC): Primary | ICD-10-CM

## 2019-03-14 LAB
EST. AVERAGE GLUCOSE BLD GHB EST-MCNC: 126 MG/DL (ref 68–126)
HBA1C MFR BLD HPLC: 6 % (ref ?–5.7)

## 2019-03-14 PROCEDURE — 99214 OFFICE O/P EST MOD 30 MIN: CPT | Performed by: INTERNAL MEDICINE

## 2019-03-14 PROCEDURE — 83036 HEMOGLOBIN GLYCOSYLATED A1C: CPT | Performed by: INTERNAL MEDICINE

## 2019-03-14 RX ORDER — AZITHROMYCIN 250 MG/1
TABLET, FILM COATED ORAL
Qty: 6 TABLET | Refills: 0 | Status: SHIPPED | OUTPATIENT
Start: 2019-03-14 | End: 2019-05-20 | Stop reason: ALTCHOICE

## 2019-03-14 NOTE — PROGRESS NOTES
Problem 1 diabetes mellitus patient denies any polyuria dysuria hematuria. Up-to-date on eye exam.  Problem 2 hypertension he denies headaches dizziness chest pain or shortness of breath. Patient is requesting a prescription for Z-Ayan.   He is going on ex

## 2019-05-20 ENCOUNTER — OFFICE VISIT (OUTPATIENT)
Dept: INTERNAL MEDICINE CLINIC | Facility: CLINIC | Age: 84
End: 2019-05-20
Payer: MEDICARE

## 2019-05-20 VITALS
RESPIRATION RATE: 16 BRPM | SYSTOLIC BLOOD PRESSURE: 135 MMHG | HEART RATE: 55 BPM | HEIGHT: 70.13 IN | TEMPERATURE: 98 F | OXYGEN SATURATION: 97 % | BODY MASS INDEX: 29.01 KG/M2 | DIASTOLIC BLOOD PRESSURE: 70 MMHG | WEIGHT: 202.63 LBS

## 2019-05-20 DIAGNOSIS — E11.9 DIABETES MELLITUS TYPE 2 IN NONOBESE (HCC): ICD-10-CM

## 2019-05-20 DIAGNOSIS — E11.22 TYPE 2 DIABETES MELLITUS WITH STAGE 3 CHRONIC KIDNEY DISEASE, WITHOUT LONG-TERM CURRENT USE OF INSULIN (HCC): Primary | ICD-10-CM

## 2019-05-20 DIAGNOSIS — N18.30 TYPE 2 DIABETES MELLITUS WITH STAGE 3 CHRONIC KIDNEY DISEASE, WITHOUT LONG-TERM CURRENT USE OF INSULIN (HCC): Primary | ICD-10-CM

## 2019-05-20 DIAGNOSIS — Z00.00 ENCOUNTER FOR ANNUAL HEALTH EXAMINATION: ICD-10-CM

## 2019-05-20 DIAGNOSIS — E11.69 DM TYPE 2 WITH DIABETIC DYSLIPIDEMIA (HCC): ICD-10-CM

## 2019-05-20 DIAGNOSIS — H54.40 BLIND RIGHT EYE: ICD-10-CM

## 2019-05-20 DIAGNOSIS — I10 HTN, GOAL BELOW 130/80: ICD-10-CM

## 2019-05-20 DIAGNOSIS — E78.5 DM TYPE 2 WITH DIABETIC DYSLIPIDEMIA (HCC): ICD-10-CM

## 2019-05-20 DIAGNOSIS — E83.42 HYPOMAGNESEMIA: ICD-10-CM

## 2019-05-20 DIAGNOSIS — Z90.5 STATUS POST NEPHRECTOMY: ICD-10-CM

## 2019-05-20 PROCEDURE — G0439 PPPS, SUBSEQ VISIT: HCPCS | Performed by: INTERNAL MEDICINE

## 2019-05-20 RX ORDER — LEVOFLOXACIN 500 MG/1
500 TABLET, FILM COATED ORAL DAILY
Qty: 10 TABLET | Refills: 0 | Status: SHIPPED | OUTPATIENT
Start: 2019-05-20 | End: 2019-05-27

## 2019-05-20 RX ORDER — CETIRIZINE HYDROCHLORIDE 10 MG/1
10 TABLET ORAL DAILY PRN
Status: ON HOLD | COMMUNITY
End: 2019-10-18

## 2019-05-20 NOTE — PROGRESS NOTES
HPI:   Leidy Yung is a 80year old male who presents for a Medicare Subsequent Annual Wellness visit (Pt already had Initial Annual Wellness).     Weight loss  His last annual assessment has been over 1 year: Annual Physical due on 04/30/2019         [facility] Former Smoker        Quit date: 1961        Years since quittin.4      Smokeless tobacco: Never Used      Tobacco comment: QUIT 48 YRS.  AGO         CAGE Alcohol screening   Rosalie Gonsalez was screened for Alcohol abuse and had a score of 0 so is at Sildenafil Citrate 50 MG Oral Tab Take 1 tablet (50 mg total) by mouth daily as needed for Erectile Dysfunction. Fenofibrate 145 MG Oral Tab Take 1 tablet (145 mg total) by mouth daily.  Patient takes 1/2 pill daily   saccharomyces boulardii 250 MG Oral congestion  SKIN: denies any unusual skin lesions  EYES: denies blurred vision or double vision  HEENT: denies nasal congestion, sinus pain or ST  LUNGS: denies shortness of breath with exertion  CARDIOVASCULAR: denies chest pain on exertion  GI: denies ab normal, atraumatic, no cyanosis or edema   Pulses: 2+ and symmetric   Skin: Skin color, texture, turgor normal, no rashes or lesions   Lymph nodes: Cervical, supraclavicular, and axillary nodes normal   Neurologic: Normal            Vaccination History well-being?: Social Interaction;Games;Puzzles; Visiting Friends; Visiting Family    This section provided for quick review of chart, separate sheet to patient  [address] Internal Lab or Procedure External Lab or Procedur your 65th birthday    Hepatitis B for Moderate/High Risk No vaccine history found Medium/high risk factors:   End-stage renal disease   Hemophiliacs who received Factor VIII or IX concentrates   Clients of institutions for the mentally retarded   Persons w flowsheet data found.

## 2019-05-20 NOTE — PATIENT INSTRUCTIONS
Genevieve Stanley's SCREENING SCHEDULE   Tests on this list are recommended by your physician but may not be covered, or covered at this frequency, by your insurer. Please check with your insurance carrier before scheduling to verify coverage.     PREVENTATIV previous visit.  Limited to patients who meet one of the following criteria:   • Men who are 73-68 years old and have smoked more than 100 cigarettes in their lifetime   • Anyone with a family history    Colorectal Cancer Screening Covered up to Age 76 Hemophiliacs who received Factor VIII or IX concentrates   Clients of institutions for the mentally retarded   Persons who live in the same house as a HepB virus carrier   Homosexual men   Illicit injectable drug abusers     Tetanus Toxoid- Only covered

## 2019-05-21 RX ORDER — AMOXICILLIN 500 MG/1
500 CAPSULE ORAL 3 TIMES DAILY
Qty: 21 CAPSULE | Refills: 0 | Status: SHIPPED | OUTPATIENT
Start: 2019-05-21 | End: 2019-05-31

## 2019-05-21 RX ORDER — CODEINE PHOSPHATE AND GUAIFENESIN 10; 100 MG/5ML; MG/5ML
5 SOLUTION ORAL 4 TIMES DAILY PRN
Qty: 10 ML | Refills: 0 | Status: ON HOLD | OUTPATIENT
Start: 2019-05-21 | End: 2019-10-18

## 2019-05-21 NOTE — PROGRESS NOTES
Werner Adams took the Levaquin last night and had difficulty sleeping felt very anxious.   He was asked to stop the Levaquin and will put him on amoxicillin 500 3 times daily x7 days

## 2019-05-27 ENCOUNTER — APPOINTMENT (OUTPATIENT)
Dept: GENERAL RADIOLOGY | Facility: HOSPITAL | Age: 84
End: 2019-05-27
Attending: EMERGENCY MEDICINE
Payer: MEDICARE

## 2019-05-27 ENCOUNTER — HOSPITAL ENCOUNTER (EMERGENCY)
Facility: HOSPITAL | Age: 84
Discharge: HOME | End: 2019-05-27
Attending: EMERGENCY MEDICINE
Payer: MEDICARE

## 2019-05-27 VITALS
HEART RATE: 55 BPM | SYSTOLIC BLOOD PRESSURE: 146 MMHG | DIASTOLIC BLOOD PRESSURE: 77 MMHG | OXYGEN SATURATION: 97 % | TEMPERATURE: 98 F | RESPIRATION RATE: 16 BRPM

## 2019-05-27 DIAGNOSIS — S93.402A MILD SPRAIN OF LEFT ANKLE, INITIAL ENCOUNTER: Primary | ICD-10-CM

## 2019-05-27 PROCEDURE — 99283 EMERGENCY DEPT VISIT LOW MDM: CPT

## 2019-05-27 PROCEDURE — 73630 X-RAY EXAM OF FOOT: CPT | Performed by: EMERGENCY MEDICINE

## 2019-05-27 PROCEDURE — 73610 X-RAY EXAM OF ANKLE: CPT | Performed by: EMERGENCY MEDICINE

## 2019-05-27 NOTE — ED PROVIDER NOTES
Patient Seen in: [facility] Emergency Department    History   Patient presents with:  Lower Extremity Injury (musculoskeletal)    Stated Complaint:     HPI    The patient is an 40-year-old male who presents emergency room with a history of suffering Resp 16   Temp 97.8 °F (36.6 °C)   Temp src Oral   SpO2 97 %   O2 Device None (Room air)       Current:/77   Pulse 55   Temp 97.8 °F (36.6 °C) (Oral)   Resp 16   SpO2 97%         Physical Exam  GENERAL: Well-developed, well-nourished male sitting u time.              Disposition and Plan     Clinical Impression:  Mild sprain of left ankle, initial encounter  (primary encounter diagnosis)    Disposition:  Discharge  5/27/2019  7:46 pm    Follow-up:  Esequiel Silverman MD  [address] [place] [address]

## 2019-05-28 ENCOUNTER — TELEPHONE (OUTPATIENT)
Dept: INTERNAL MEDICINE CLINIC | Facility: CLINIC | Age: 84
End: 2019-05-28

## 2019-05-28 NOTE — TELEPHONE ENCOUNTER
Patient is requesting handicap parking sticker. I did give him a call and asked him what his handicap was. 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

## 2019-06-14 ENCOUNTER — OFFICE VISIT (OUTPATIENT)
Dept: INTERNAL MEDICINE CLINIC | Facility: CLINIC | Age: 84
End: 2019-06-14
Payer: MEDICARE

## 2019-06-14 VITALS
RESPIRATION RATE: 16 BRPM | OXYGEN SATURATION: 97 % | HEIGHT: 70.13 IN | SYSTOLIC BLOOD PRESSURE: 124 MMHG | TEMPERATURE: 97 F | DIASTOLIC BLOOD PRESSURE: 70 MMHG | HEART RATE: 60 BPM | BODY MASS INDEX: 29.06 KG/M2 | WEIGHT: 203 LBS

## 2019-06-14 DIAGNOSIS — J22 LRTI (LOWER RESPIRATORY TRACT INFECTION): Primary | ICD-10-CM

## 2019-06-14 PROCEDURE — 99213 OFFICE O/P EST LOW 20 MIN: CPT | Performed by: INTERNAL MEDICINE

## 2019-06-14 RX ORDER — AMOXICILLIN AND CLAVULANATE POTASSIUM 500; 125 MG/1; MG/1
1 TABLET, FILM COATED ORAL 3 TIMES DAILY
Qty: 42 TABLET | Refills: 0 | Status: ON HOLD | OUTPATIENT
Start: 2019-06-14 | End: 2019-10-18

## 2019-06-14 RX ORDER — AMOXICILLIN AND CLAVULANATE POTASSIUM 500; 125 MG/1; MG/1
1 TABLET, FILM COATED ORAL 3 TIMES DAILY
Qty: 30 TABLET | Refills: 0 | Status: SHIPPED | OUTPATIENT
Start: 2019-06-14 | End: 2019-06-14

## 2019-06-14 NOTE — TELEPHONE ENCOUNTER
I informed Avery Marrufo that he is allergic to Levaquin we will switch him to Augmentin
Inform Eddie Link that prescription was emailed to his pharmacy
Patient informed
Patient reported to Diana Brasher that you spoke with him in the gutierrez regarding getting a prescription for Z alber
zpak medicine
(1) body pink, extremities blue

## 2019-07-22 ENCOUNTER — TELEPHONE (OUTPATIENT)
Dept: INTERNAL MEDICINE CLINIC | Facility: CLINIC | Age: 84
End: 2019-07-22

## 2019-07-22 DIAGNOSIS — E11.69 DM TYPE 2 WITH DIABETIC DYSLIPIDEMIA (HCC): Primary | ICD-10-CM

## 2019-07-22 DIAGNOSIS — E78.5 DM TYPE 2 WITH DIABETIC DYSLIPIDEMIA (HCC): Primary | ICD-10-CM

## 2019-07-22 RX ORDER — LANCETS 28 GAUGE
EACH MISCELLANEOUS
Qty: 100 EACH | Refills: 12 | Status: ON HOLD | OUTPATIENT
Start: 2019-07-22 | End: 2019-10-18

## 2019-10-18 ENCOUNTER — APPOINTMENT (OUTPATIENT)
Dept: GENERAL RADIOLOGY | Facility: HOSPITAL | Age: 84
DRG: 690 | End: 2019-10-18
Attending: EMERGENCY MEDICINE
Payer: MEDICARE

## 2019-10-18 ENCOUNTER — HOSPITAL ENCOUNTER (INPATIENT)
Facility: HOSPITAL | Age: 84
LOS: 4 days | Discharge: HOME | DRG: 690 | End: 2019-10-22
Attending: EMERGENCY MEDICINE | Admitting: HOSPITALIST
Payer: MEDICARE

## 2019-10-18 DIAGNOSIS — N12 PYELONEPHRITIS: Primary | ICD-10-CM

## 2019-10-18 DIAGNOSIS — B02.30 HERPES ZOSTER OPHTHALMICUS OF LEFT EYE: ICD-10-CM

## 2019-10-18 DIAGNOSIS — R11.2 NON-INTRACTABLE VOMITING WITH NAUSEA, UNSPECIFIED VOMITING TYPE: ICD-10-CM

## 2019-10-18 PROBLEM — R73.9 HYPERGLYCEMIA: Status: ACTIVE | Noted: 2019-10-18

## 2019-10-18 PROCEDURE — 71045 X-RAY EXAM CHEST 1 VIEW: CPT | Performed by: EMERGENCY MEDICINE

## 2019-10-18 PROCEDURE — 99223 1ST HOSP IP/OBS HIGH 75: CPT | Performed by: HOSPITALIST

## 2019-10-18 RX ORDER — LORATADINE 10 MG/1
10 TABLET ORAL
COMMUNITY

## 2019-10-18 RX ORDER — VALACYCLOVIR HYDROCHLORIDE 500 MG/1
500 TABLET, FILM COATED ORAL 2 TIMES DAILY
Status: DISCONTINUED | OUTPATIENT
Start: 2019-10-18 | End: 2019-10-22

## 2019-10-18 RX ORDER — ATENOLOL 25 MG/1
25 TABLET ORAL DAILY
COMMUNITY

## 2019-10-18 RX ORDER — HYDROMORPHONE HYDROCHLORIDE 1 MG/ML
0.4 INJECTION, SOLUTION INTRAMUSCULAR; INTRAVENOUS; SUBCUTANEOUS EVERY 2 HOUR PRN
Status: DISCONTINUED | OUTPATIENT
Start: 2019-10-18 | End: 2019-10-22

## 2019-10-18 RX ORDER — HYDROMORPHONE HYDROCHLORIDE 1 MG/ML
0.5 INJECTION, SOLUTION INTRAMUSCULAR; INTRAVENOUS; SUBCUTANEOUS EVERY 30 MIN PRN
Status: DISCONTINUED | OUTPATIENT
Start: 2019-10-18 | End: 2019-10-18

## 2019-10-18 RX ORDER — POLYETHYLENE GLYCOL 3350 17 G/17G
17 POWDER, FOR SOLUTION ORAL DAILY PRN
Status: DISCONTINUED | OUTPATIENT
Start: 2019-10-18 | End: 2019-10-22

## 2019-10-18 RX ORDER — ATENOLOL 25 MG/1
25 TABLET ORAL DAILY
Status: DISCONTINUED | OUTPATIENT
Start: 2019-10-18 | End: 2019-10-22

## 2019-10-18 RX ORDER — TERAZOSIN 10 MG/1
10 CAPSULE ORAL NIGHTLY
Status: DISCONTINUED | OUTPATIENT
Start: 2019-10-18 | End: 2019-10-22

## 2019-10-18 RX ORDER — ACETAMINOPHEN 500 MG
500 TABLET ORAL ONCE
Status: COMPLETED | OUTPATIENT
Start: 2019-10-18 | End: 2019-10-18

## 2019-10-18 RX ORDER — ENALAPRIL MALEATE 20 MG/1
20 TABLET ORAL 2 TIMES DAILY
COMMUNITY
End: 2020-01-10 | Stop reason: DRUGHIGH

## 2019-10-18 RX ORDER — ASPIRIN 81 MG/1
81 TABLET, CHEWABLE ORAL DAILY
Status: DISCONTINUED | OUTPATIENT
Start: 2019-10-18 | End: 2019-10-22

## 2019-10-18 RX ORDER — SODIUM CHLORIDE 9 MG/ML
INJECTION, SOLUTION INTRAVENOUS CONTINUOUS
Status: DISCONTINUED | OUTPATIENT
Start: 2019-10-18 | End: 2019-10-21

## 2019-10-18 RX ORDER — OMEPRAZOLE 20 MG/1
20 CAPSULE, DELAYED RELEASE ORAL
COMMUNITY

## 2019-10-18 RX ORDER — VALACYCLOVIR HYDROCHLORIDE 500 MG/1
500 TABLET, FILM COATED ORAL
Refills: 0 | COMMUNITY
Start: 2019-10-11

## 2019-10-18 RX ORDER — ONDANSETRON 2 MG/ML
4 INJECTION INTRAMUSCULAR; INTRAVENOUS EVERY 6 HOURS PRN
Status: DISCONTINUED | OUTPATIENT
Start: 2019-10-18 | End: 2019-10-22

## 2019-10-18 RX ORDER — BISACODYL 10 MG
10 SUPPOSITORY, RECTAL RECTAL
Status: DISCONTINUED | OUTPATIENT
Start: 2019-10-18 | End: 2019-10-19

## 2019-10-18 RX ORDER — SIMETHICONE 80 MG
80 TABLET,CHEWABLE ORAL EVERY 6 HOURS PRN
COMMUNITY
End: 2020-02-03

## 2019-10-18 RX ORDER — SIMETHICONE 80 MG
80 TABLET,CHEWABLE ORAL EVERY 6 HOURS PRN
Status: DISCONTINUED | OUTPATIENT
Start: 2019-10-18 | End: 2019-10-22

## 2019-10-18 RX ORDER — TRIFLURIDINE 10 MG/ML
1 SOLUTION OPHTHALMIC 4 TIMES DAILY
Refills: 1 | COMMUNITY
Start: 2019-10-11 | End: 2019-12-02 | Stop reason: ALTCHOICE

## 2019-10-18 RX ORDER — TERAZOSIN 10 MG/1
10 CAPSULE ORAL NIGHTLY
COMMUNITY
End: 2020-02-03

## 2019-10-18 RX ORDER — SODIUM PHOSPHATE, DIBASIC AND SODIUM PHOSPHATE, MONOBASIC 7; 19 G/133ML; G/133ML
1 ENEMA RECTAL ONCE AS NEEDED
Status: DISCONTINUED | OUTPATIENT
Start: 2019-10-18 | End: 2019-10-19

## 2019-10-18 RX ORDER — ONDANSETRON 2 MG/ML
4 INJECTION INTRAMUSCULAR; INTRAVENOUS ONCE
Status: DISCONTINUED | OUTPATIENT
Start: 2019-10-18 | End: 2019-10-22

## 2019-10-18 RX ORDER — ONDANSETRON 2 MG/ML
4 INJECTION INTRAMUSCULAR; INTRAVENOUS EVERY 4 HOURS PRN
Status: DISCONTINUED | OUTPATIENT
Start: 2019-10-18 | End: 2019-10-18

## 2019-10-18 RX ORDER — TRIFLURIDINE 10 MG/ML
1 SOLUTION OPHTHALMIC 4 TIMES DAILY
Status: DISCONTINUED | OUTPATIENT
Start: 2019-10-18 | End: 2019-10-21

## 2019-10-18 RX ORDER — PANTOPRAZOLE SODIUM 20 MG/1
20 TABLET, DELAYED RELEASE ORAL
Status: DISCONTINUED | OUTPATIENT
Start: 2019-10-19 | End: 2019-10-22

## 2019-10-18 RX ORDER — GENTAMICIN SULFATE 3 MG/ML
1 SOLUTION/ DROPS OPHTHALMIC 4 TIMES DAILY
Refills: 2 | Status: ON HOLD | COMMUNITY
Start: 2019-10-07 | End: 2019-10-22

## 2019-10-18 RX ORDER — SODIUM CHLORIDE 9 MG/ML
INJECTION, SOLUTION INTRAVENOUS CONTINUOUS
Status: ACTIVE | OUTPATIENT
Start: 2019-10-18 | End: 2019-10-18

## 2019-10-18 RX ORDER — DOCUSATE SODIUM 100 MG/1
100 CAPSULE, LIQUID FILLED ORAL 2 TIMES DAILY
Status: DISCONTINUED | OUTPATIENT
Start: 2019-10-18 | End: 2019-10-19

## 2019-10-18 RX ORDER — GARLIC EXTRACT 500 MG
1 CAPSULE ORAL 2 TIMES DAILY
Status: DISCONTINUED | OUTPATIENT
Start: 2019-10-18 | End: 2019-10-22

## 2019-10-18 RX ORDER — HYDROMORPHONE HYDROCHLORIDE 1 MG/ML
0.8 INJECTION, SOLUTION INTRAMUSCULAR; INTRAVENOUS; SUBCUTANEOUS EVERY 2 HOUR PRN
Status: DISCONTINUED | OUTPATIENT
Start: 2019-10-18 | End: 2019-10-22

## 2019-10-18 RX ORDER — METOCLOPRAMIDE HYDROCHLORIDE 5 MG/ML
5 INJECTION INTRAMUSCULAR; INTRAVENOUS EVERY 8 HOURS PRN
Status: DISCONTINUED | OUTPATIENT
Start: 2019-10-18 | End: 2019-10-22

## 2019-10-18 RX ORDER — HYDROMORPHONE HYDROCHLORIDE 1 MG/ML
0.2 INJECTION, SOLUTION INTRAMUSCULAR; INTRAVENOUS; SUBCUTANEOUS EVERY 2 HOUR PRN
Status: DISCONTINUED | OUTPATIENT
Start: 2019-10-18 | End: 2019-10-22

## 2019-10-18 RX ORDER — ACETAMINOPHEN 325 MG/1
650 TABLET ORAL EVERY 6 HOURS PRN
Status: DISCONTINUED | OUTPATIENT
Start: 2019-10-18 | End: 2019-10-22

## 2019-10-18 RX ORDER — IPRATROPIUM BROMIDE 42 UG/1
2 SPRAY, METERED NASAL 2 TIMES DAILY
Status: DISCONTINUED | OUTPATIENT
Start: 2019-10-18 | End: 2019-10-22

## 2019-10-18 RX ORDER — ACETAMINOPHEN 500 MG
1000 TABLET ORAL ONCE
Status: DISCONTINUED | OUTPATIENT
Start: 2019-10-18 | End: 2019-10-22

## 2019-10-18 RX ORDER — HEPARIN SODIUM 5000 [USP'U]/ML
5000 INJECTION, SOLUTION INTRAVENOUS; SUBCUTANEOUS EVERY 8 HOURS SCHEDULED
Status: DISCONTINUED | OUTPATIENT
Start: 2019-10-18 | End: 2019-10-22

## 2019-10-18 NOTE — PROGRESS NOTES
Pharmacy Note: Renal dose adjustment for Metoclopramide (Reglan)  Hansel Herrera has been prescribed Metoclopramide (Reglan) 10 mg every 8 hours as needed. Estimated Creatinine Clearance: 37.8 mL/min (A) (based on SCr of 1.51 mg/dL (H)).     His calculate

## 2019-10-18 NOTE — ED PROVIDER NOTES
Patient Seen in: [facility] Emergency Department      History   Patient presents with:  Fever    Stated Complaint: fever vomitng     HPI    71-year-old male presents here to the emergency department from an assisted living facility due to report of mucosa slightly dry tongue is midline. Neck is supple without tenderness. Lungs: Clear to auscultation bilaterally. No wheezes, rhonchi, or rales appreciated. No accessory muscle use noted for breathing. Cardiac: Regular rate and rhythm.   Normal S1 CBC W/ DIFFERENTIAL[[id number]]          Abnormal            Final result                 Please view results for these tests on the individual orders.    URINE CULTURE, ROUTINE                  MDM     The patient had laboratories that were sen ophthalmicus of left eye    Disposition:  Admit  10/18/2019  4:39 am    Follow-up:  No follow-up provider specified.       Medications Prescribed:  Current Discharge Medication List                   Present on Admission  Date Reviewed: 6/14/2019          I

## 2019-10-18 NOTE — H&P
[facility]  History and Physical     Areta Corado Patient Status:  Emergency    5/15/1935 MRN [id number]   Location [address] [facility] Attending Deronda Fabry, MD   Hosp Day # 0 PCP Poli Mckenzie MD     Chief Complain daily, Disp: 100 each, Rfl: 12  Glucose Blood In Vitro Strip, Test sugar once daily, Disp: 100 each, Rfl: 12  Amoxicillin-Pot Clavulanate 500-125 MG Oral Tab, Take 1 tablet by mouth 3 (three) times daily. , Disp: 42 tablet, Rfl: 0  guaiFENesin-codeine (BENJI of systems was completed. Pertinent positives and negatives noted in the HPI. Physical Exam:    /55   Pulse 70   Temp 100.2 °F (37.9 °C) (Oral)   Resp 20   Wt 205 lb (93 kg)   SpO2 92%   BMI 29.31 kg/m²   General: No acute distress.  Alert and o metformin. 6. GERD- Will continue on PPI. 7. Ocular shingles- Will continue home medication.         Quality:  · DVT Prophylaxis: Heparin  · CODE status: Full  · Enriquez: N/A    Plan of care discussed with pt at bedside    Jeanne Viveros DO  10/

## 2019-10-18 NOTE — PROGRESS NOTES
Hospital follow up    D/w daughter Mason over the phone    Will continue antibiotics, increase IVF recheck labs in am and monitor HALLIE Joseph

## 2019-10-18 NOTE — PLAN OF CARE
Admitted from ER with DX of pyelonephritis c/o fever,urinary frequency. H/O DM,left nephrectomy in 2016,blind in right eye,currently pt.states with shingles in left eye. Brought his own eye drops prescribed by his ophthalmologist.

## 2019-10-19 PROCEDURE — 99232 SBSQ HOSP IP/OBS MODERATE 35: CPT | Performed by: HOSPITALIST

## 2019-10-19 PROCEDURE — 99223 1ST HOSP IP/OBS HIGH 75: CPT | Performed by: INTERNAL MEDICINE

## 2019-10-19 RX ORDER — DEXTROSE MONOHYDRATE 25 G/50ML
50 INJECTION, SOLUTION INTRAVENOUS
Status: DISCONTINUED | OUTPATIENT
Start: 2019-10-19 | End: 2019-10-22

## 2019-10-19 NOTE — CONSULTS
INFECTIOUS DISEASE CONSULT NOTE    Paris Molina Patient Status:  Inpatient    5/15/1935 MRN [id number]   [facility] 4NW-A Attending Burgess Curtis MD   Hosp Day # 1 PCP Deborah Tate Other (Other) Father         AGE   • Ulcerative Colitis Mother    • Hypertension Mother    • Other (Other) Mother         KIDNEY      reports that he quit smoking about 58 years ago.  He has never used smokeless tobacco. He reports current alcohol use of ab dextrose 5 % 100 mL ADD-vantage, 3.375 g, Intravenous, Q8H  •  trifluridine (VIROPTIC) 1 % ophthalmic solution 1 drop, 1 drop, Left Eye, QID  •  aspirin chewable tab 81 mg, 81 mg, Oral, Daily  •  atenolol (TENORMIN) tab 25 mg, 25 mg, Oral, Daily  •  Ipratr 37.2*   MCV 89.6   MCH 29.9   MCHC 33.3   RDW 14.4   NEPRELIM 12.18*   WBC 14.1*   .0*     Recent Labs   Lab 10/18/19  0338 10/18/19  0807 10/19/19  0444   * 132* 101*   BUN 19* 22* 25*   CREATSERUM 1.51* 1.74* 1.71*   GFRAA 48* 41* 42*   GFR RN    Thank you for allowing me to participate in the care of this patient. Please do not hesitate to call if you have any questions. I will continue to follow with you and will make further recommendations based on his progress.     Angel Galaviz

## 2019-10-19 NOTE — PROGRESS NOTES
[facility]  Progress Note     [facility] Patient Status:  Inpatient    5/15/1935 MRN [id number]   [facility] 4NW-A Attending Zak Salguero MD   Hosp Day # 1 PCP Dorina Pathak MD     Chief Complaint: pyelonephritis    S: P the last 168 hours. No results for input(s): TROP, CK in the last 168 hours. Imaging: Imaging data reviewed in Epic.     Medications:   • ondansetron HCl  4 mg Intravenous Once   • acetaminophen  1,000 mg Oral Once   • Heparin Sodium (Porcine)  5 Certification of Need for Inpatient Hospitalization - Initial Certification    Patient will require inpatient services that will reasonably be expected to span two midnight's based on the clinical documentation in H+P.    Based on patients current state of

## 2019-10-19 NOTE — CONSULTS
[facility]  Report of Consultation    Nicole Becerril Patient Status:  Inpatient    5/15/1935 MRN [id number]   [facility] 4NW-A Attending Amara Morris MD   Hosp Day # 1 PCP Rebbeca Lesches, MD       Assessment / Plan:    1) CKD 3- hemodynamically stable without evidence of sepsis. Electrolyte acid-base and volume status are acceptable. Although he looks very well, his daughter has a number of concerns which do not really make sense.     History:  Past Medical History:   Diagnosis D Intravenous, Q2H PRN **OR** HYDROmorphone HCl (DILAUDID) 1 MG/ML injection 0.8 mg, 0.8 mg, Intravenous, Q2H PRN  •  ondansetron HCl (ZOFRAN) injection 4 mg, 4 mg, Intravenous, Q6H PRN  •  Metoclopramide HCl (REGLAN) injection 5 mg, 5 mg, Intravenous, Q8H P Encounters:  10/19/19 : 210 lb 4.8 oz (95.4 kg)  06/14/19 : 203 lb (92.1 kg)  05/20/19 : 202 lb 9.6 oz (91.9 kg)    General: awake alert  HEENT: No scleral icterus, MMM  Neck: Supple, no EDITA or thyromegaly  Cardiac: Regular rate and rhythm, S1, S2 normal,

## 2019-10-19 NOTE — PLAN OF CARE
Pt. Alert and oriented x4, room air, afebrile this AM. Up w/stby to bathroom. Pt with dysuria; strict I/O. Patient med orders received and resumed. Pt running fever late this afternoon. Tylenol administered and temperature climbed to 102. 1.  Pagegenesis MERCADO; one fall injury  Description  INTERVENTIONS:  - Assess pt frequently for physical needs  - Identify cognitive and physical deficits and behaviors that affect risk of falls.   - [place] fall precautions as indicated by assessment.  - Educate pt/family on patie

## 2019-10-19 NOTE — PROGRESS NOTES
Pt is up in chair today and family at bedside. Ultrasound called and they are aware of the order for renal ultrasound.  Pt has had no bowel movements today

## 2019-10-19 NOTE — PLAN OF CARE
With HS meds patient stated he has had 4 loose watery bowel movements since this am. No one has witnessed. Placed in contact + per protocol and container placed in bathroom to obtain stool.  Son and daughter informed and hand out given for contact + for r/o

## 2019-10-20 ENCOUNTER — APPOINTMENT (OUTPATIENT)
Dept: ULTRASOUND IMAGING | Facility: HOSPITAL | Age: 84
DRG: 690 | End: 2019-10-20
Attending: INTERNAL MEDICINE
Payer: MEDICARE

## 2019-10-20 PROCEDURE — 3E1K78Z IRRIGATION OF GENITOURINARY TRACT USING IRRIGATING SUBSTANCE, VIA NATURAL OR ARTIFICIAL OPENING: ICD-10-PCS | Performed by: HOSPITALIST

## 2019-10-20 PROCEDURE — 99233 SBSQ HOSP IP/OBS HIGH 50: CPT | Performed by: INTERNAL MEDICINE

## 2019-10-20 PROCEDURE — 99232 SBSQ HOSP IP/OBS MODERATE 35: CPT | Performed by: HOSPITALIST

## 2019-10-20 PROCEDURE — 76770 US EXAM ABDO BACK WALL COMP: CPT | Performed by: INTERNAL MEDICINE

## 2019-10-20 RX ORDER — ALFUZOSIN HYDROCHLORIDE 10 MG/1
10 TABLET, EXTENDED RELEASE ORAL
Status: DISCONTINUED | OUTPATIENT
Start: 2019-10-20 | End: 2019-10-22

## 2019-10-20 RX ORDER — FINASTERIDE 5 MG/1
5 TABLET, FILM COATED ORAL DAILY
Status: DISCONTINUED | OUTPATIENT
Start: 2019-10-20 | End: 2019-10-22

## 2019-10-20 RX ORDER — ENALAPRIL MALEATE 10 MG/1
20 TABLET ORAL 2 TIMES DAILY
Status: DISCONTINUED | OUTPATIENT
Start: 2019-10-20 | End: 2019-10-22

## 2019-10-20 RX ORDER — MAGNESIUM HYDROXIDE 1200 MG/15ML
3000 LIQUID ORAL CONTINUOUS
Status: DISCONTINUED | OUTPATIENT
Start: 2019-10-20 | End: 2019-10-21

## 2019-10-20 NOTE — PHYSICAL THERAPY NOTE
Attempted to see patient for PT Evaluation; RN approved session earlier. RN informed this PT upon arrival in room that pt just got his lunch tray & requested for PT to come back at a later time today, will see patient as soon as schedule permits.

## 2019-10-20 NOTE — PHYSICAL THERAPY NOTE
Pt was seen earlier ambulating with daughter in the unit.   Attempted to see patient for PT Evaluation again, pt in bed & daughter reported that pt was having R hip & back pain/discomfort probably due to less activity & was wanting PT for massage/manipulati

## 2019-10-20 NOTE — PLAN OF CARE
Patient's daughter requesting diet changed to regular. She claims patient's blood sugar is fine & he shouldn't be on restricted diet.

## 2019-10-20 NOTE — PLAN OF CARE
Dr Rocky Chen urologist saw & examined patient,He wanted to continue mccullough/CBI & started patient on flomax & proscar. RN started patient on these new meds,Med education provided to both patient & daughter. Patient was seen ambulating the halls with daughter's ass

## 2019-10-20 NOTE — PLAN OF CARE
Problem: Diabetes/Glucose Control  Goal: Glucose maintained within prescribed range  Description  INTERVENTIONS:  - Monitor Blood Glucose as ordered  - Assess for signs and symptoms of hyperglycemia and hypoglycemia  - Administer ordered medications to m SAFETY ADULT - FALL  Goal: Free from fall injury  Description  INTERVENTIONS:  - Assess pt frequently for physical needs  - Identify cognitive and physical deficits and behaviors that affect risk of falls.   - [place] fall precautions as indicated by asse

## 2019-10-20 NOTE — PROGRESS NOTES
INFECTIOUS DISEASE PROGRESS NOTE    Miguel Angel Bell Patient Status:  Inpatient    5/15/1935 MRN [id number]   [facility] 4NW-A Attending Fausto Jameson MD   Hosp Day # 2 PCP Jack Osborn MD     Subjective:  Afebrile. Feels better.  Tole urinary stream beginning earlier that day without abdominal, flank pain or diarrhea. On admission temperature 100.2 and WBC 8.7 with T-max since admission of 103. UA with pyuria. Lactic acid normal.  Started IV Zosyn.   Also treatment for zoster optic ephraim

## 2019-10-20 NOTE — PLAN OF CARE
Resting in bed at this time. Had US early am today. Was seen by Dr Mercy Roa for uro consult. Awaiting further orders. CBI in progress urine is now yellow, no clots noted. Denies having any pain. Ceftriaxone started w/ no adverse reactions noted.  Assisted javy

## 2019-10-20 NOTE — PROGRESS NOTES
[facility]  Progress Note     Padma Corado Patient Status:  Inpatient    5/15/1935 MRN [id number]   [facility] 4NW-A Attending Larry Kirk MD   Hosp Day # 2 PCP Poli Mckenzie MD     Chief Complaint: pyelonephritis    S: o results for input(s): PTP, INR in the last 168 hours. No results for input(s): TROP, CK in the last 168 hours. Imaging: Imaging data reviewed in Epic.     Medications:   • Enalapril Maleate  20 mg Oral BID   • cefTRIAXone  2 g Intravenous Q24H Kimber Villa MD

## 2019-10-20 NOTE — CONSULTS
[facility]  Report of Consultation    Raymarguerite Howard Patient Status:  Inpatient    5/15/1935 MRN [id number]   [facility] 4NW-A Attending Saturnino Paulino MD   Hosp Day # 2 PCP Silverio Stanley MD     Reason for Consultation:  GROSS HE 2 g, Intravenous, Q24H  •  glucose (DEX4) oral liquid 15 g, 15 g, Oral, Q15 Min PRN **OR** Glucose-Vitamin C (DEX-4) chewable tab 4 tablet, 4 tablet, Oral, Q15 Min PRN **OR** dextrose 50 % injection 50 mL, 50 mL, Intravenous, Q15 Min PRN **OR** glucose (DE Oral, Nightly  •  valACYclovir HCl (VALTREX) tab 500 mg, 500 mg, Oral, BID    Review of Systems:  A comprehensive review of systems was negative except for: Gastrointestinal: positive for constipation : frequency, slow stream, urgency, dysuria     Physic mellitus type 2 in nonobese [facility])     Blind right eye     Status post nephrectomy left complication  stone     Type 2 diabetes mellitus with stage 3 chronic kidney disease, without long-term current use of insulin ([facility] [address]     DM type 2 with diabetic dyslipid

## 2019-10-20 NOTE — PLAN OF CARE
Clarification made w/ Urology re CBI if needed to continue since no further bleeding is noted,urine is now yellow in color. Obtained an from urology to stop CBI. CBI clamped & kept mccullough in place per urology instructions.

## 2019-10-20 NOTE — PROGRESS NOTES
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     Meds:   sodium chloride 0.9 % irrigation 3,000 mL, 3,000 mL, Irrigation, Continuous  Enalapril Maleate (VASOTEC) tab 20 mg, 20 mg, Oral, BID  glucose (DEX4) oral liquid 15 g, 15 g, Oral, Q15 Min PRN    Or  G (PROTONIX) EC tab 20 mg, 20 mg, Oral, QAM AC  Acidophilus/Pectin (PROBIOTIC) CAPS 1 capsule, 1 capsule, Oral, BID  simethicone (MYLICON) chewable tab 80 mg, 80 mg, Oral, Q6H PRN  Terazosin HCl (HYTRIN) cap 10 mg, 10 mg, Oral, Nightly  valACYclovir HCl (JONATHAN

## 2019-10-21 PROCEDURE — 99232 SBSQ HOSP IP/OBS MODERATE 35: CPT | Performed by: HOSPITALIST

## 2019-10-21 PROCEDURE — 99233 SBSQ HOSP IP/OBS HIGH 50: CPT | Performed by: INTERNAL MEDICINE

## 2019-10-21 RX ORDER — MAGNESIUM OXIDE 400 MG (241.3 MG MAGNESIUM) TABLET
400 TABLET 2 TIMES DAILY WITH MEALS
Status: DISCONTINUED | OUTPATIENT
Start: 2019-10-21 | End: 2019-10-22

## 2019-10-21 RX ORDER — POTASSIUM CHLORIDE 20 MEQ/1
40 TABLET, EXTENDED RELEASE ORAL EVERY 4 HOURS
Status: ACTIVE | OUTPATIENT
Start: 2019-10-21 | End: 2019-10-21

## 2019-10-21 RX ORDER — HYDRALAZINE HYDROCHLORIDE 20 MG/ML
5 INJECTION INTRAMUSCULAR; INTRAVENOUS EVERY 6 HOURS PRN
Status: DISCONTINUED | OUTPATIENT
Start: 2019-10-21 | End: 2019-10-22

## 2019-10-21 RX ORDER — POTASSIUM CHLORIDE 20 MEQ/1
20 TABLET, EXTENDED RELEASE ORAL ONCE
Status: COMPLETED | OUTPATIENT
Start: 2019-10-21 | End: 2019-10-21

## 2019-10-21 NOTE — PROGRESS NOTES
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    Kale Solo P.A.-C  [facility] Urology  10/21/2019  10:53 AM

## 2019-10-21 NOTE — PROGRESS NOTES
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

## 2019-10-21 NOTE — PLAN OF CARE
Pt alert and oriented x4. Afebrile. C/o hip pain, tylenol given with some relief. Denies SOB or nausea. SpO2 within normal limits on room air. QID accu check completed, no insulin coverage needed this evening.  Enriquez patent and intact, CBI clamped, urine dr integrity  - Monitor for areas of redness and/or skin breakdown  - Initiate interventions, skin care algorithm/standards of care as needed  Outcome: Progressing     Problem: PAIN - ADULT  Goal: Verbalizes/displays adequate comfort level or patient's stated

## 2019-10-21 NOTE — PROGRESS NOTES
INFECTIOUS DISEASE PROGRESS NOTE    Miguel Angel Bell Patient Status:  Inpatient    5/15/1935 MRN [id number]   [facility] 4NW-A Attending Fausto Jameson MD   Hosp Day # 3 PCP Jack Osborn MD     Antibiotics: zosyn--> CTX    Subjective: diagnosis of L eye zoster possibly by outpatient Optho on 10/11 with symptoms of burning, drainage w/o change in vision. On valtrex and eye drops without significant improvement.     # Acute E coli pyelonephritis  # L eye zoster ?  Uveitis on valtrex and fo

## 2019-10-21 NOTE — PROGRESS NOTES
[facility]  Progress Note     Shabbir Melendez Patient Status:  Inpatient    5/15/1935 MRN [id number]   [facility] 4NW-A Attending Sanjeev Gonzalez MD   Hosp Day # 3 PCP Urbano Corona MD     Chief Complaint: pyelonephritis    S: Estimated Creatinine Clearance: 51.4 mL/min (based on SCr of 1.11 mg/dL). No results for input(s): PTP, INR in the last 168 hours. No results for input(s): TROP, CK in the last 168 hours. Imaging: Imaging data reviewed in Epic.     Medic be discharge to: [place], [facility]    Plan of care discussed with patient and daughter at bedside, all questions answered    Daisy Reece MD

## 2019-10-22 VITALS
HEART RATE: 51 BPM | BODY MASS INDEX: 30 KG/M2 | TEMPERATURE: 98 F | WEIGHT: 210.31 LBS | OXYGEN SATURATION: 97 % | SYSTOLIC BLOOD PRESSURE: 159 MMHG | DIASTOLIC BLOOD PRESSURE: 64 MMHG | RESPIRATION RATE: 18 BRPM

## 2019-10-22 PROCEDURE — 99239 HOSP IP/OBS DSCHRG MGMT >30: CPT | Performed by: HOSPITALIST

## 2019-10-22 PROCEDURE — 99232 SBSQ HOSP IP/OBS MODERATE 35: CPT | Performed by: INTERNAL MEDICINE

## 2019-10-22 RX ORDER — FINASTERIDE 5 MG/1
5 TABLET, FILM COATED ORAL DAILY
Qty: 30 TABLET | Refills: 0 | Status: SHIPPED | OUTPATIENT
Start: 2019-10-23

## 2019-10-22 RX ORDER — ALFUZOSIN HYDROCHLORIDE 10 MG/1
10 TABLET, EXTENDED RELEASE ORAL
Qty: 30 TABLET | Refills: 0 | Status: SHIPPED | OUTPATIENT
Start: 2019-10-23 | End: 2020-02-07 | Stop reason: ALTCHOICE

## 2019-10-22 RX ORDER — CEFADROXIL 500 MG/1
500 CAPSULE ORAL 2 TIMES DAILY
Qty: 20 CAPSULE | Refills: 0 | Status: SHIPPED | OUTPATIENT
Start: 2019-10-22 | End: 2019-10-22

## 2019-10-22 RX ORDER — CEFADROXIL 500 MG/1
500 CAPSULE ORAL 2 TIMES DAILY
Qty: 20 CAPSULE | Refills: 0 | Status: SHIPPED | OUTPATIENT
Start: 2019-10-22 | End: 2019-11-01

## 2019-10-22 NOTE — PROGRESS NOTES
INFECTIOUS DISEASE PROGRESS NOTE    Erika Álvarez Patient Status:  Inpatient    5/15/1935 MRN [id number]   [facility] 4NW-A Attending Cookie Singletary MD   Hosp Day # 4 PCP Kyle Lam MD     Antibiotics: zosyn--> CTX total abx d#4 pending.     Also with diagnosis of L eye zoster possibly by outpatient Optho on 10/11 with symptoms of burning, drainage w/o change in vision.  On valtrex and eye drops without significant improvement.     # Acute E coli pyelonephritis  # BPH- at risk for

## 2019-10-22 NOTE — PROGRESS NOTES
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     Meds:   magnesium oxide (MAG-OX) tab 400 mg, 400 mg, Oral, BID with meals  hydrALAzine HCl (APRESOLINE) injection 5 mg, 5 mg, Intravenous, Q6H PRN  Enalapril Maleate (VASOTEC) tab 20 mg, 20 mg, Oral, BID  cefTRIAXone Sodium (ROCEPHIN) 2 g in s (PROBIOTIC) CAPS 1 capsule, 1 capsule, Oral, BID  simethicone (MYLICON) chewable tab 80 mg, 80 mg, Oral, Q6H PRN  Terazosin HCl (HYTRIN) cap 10 mg, 10 mg, Oral, Nightly  valACYclovir HCl (VALTREX) tab 500 mg, 500 mg, Oral, BID          Questions/concerns w

## 2019-10-22 NOTE — CM/SW NOTE
Patient to discharge to [facility] today at 4 pm via daughter in law. Mr Jaden's daughterDarlynn Rater to stay with patient at [facility]. [facility] offered, but declined at this time. [facility] informed of return.     SW/CM available if additional n

## 2019-10-22 NOTE — PLAN OF CARE
Pt alert and oriented x4. Hypertensive, MD notified. Fluids discontinued and prn orders placed. Afebrile. Denies pain or SOB. SpO2 within normal limits on room air. Enriquez patent and intact, draining large amounts of clear/yellow urine.  Family at the bedsid measures as appropriate and evaluate response  - Consider cultural and social influences on pain and pain management  - Manage/alleviate anxiety  - Utilize distraction and/or relaxation techniques  - Monitor for opioid side effects  - Notify MD/LIP if inte

## 2019-10-22 NOTE — PROGRESS NOTES
[facility]  Urology Progress Note    Ayan Kelley Patient Status:  Inpatient    5/15/1935 MRN [id number]   [facility] 4NW-A Attending Ponce Garza MD   [facility] Day # 4 PCP Ravi Davis MD     Subjective:  Ayan Kelley is a(n)

## 2019-10-22 NOTE — DISCHARGE SUMMARY
[facility] [place] [facility]ITAL[facility]  DISCHARGE SUMMARY     Vu Lopez Patient Status:  Inpatient    5/15/1935 MRN [id number]   [facility] 4NW-A Attending Brittany Resendiz MD   [facility] Day # 4 PCP Charissa Villanueva MD     Date of Admission: 10/18/2019  Date Quantity:  30 tablet  Refills:  0     finasteride 5 MG Tabs  Commonly known as:  PROSCAR  Start taking on:  October 23, 2019      Take 1 tablet (5 mg total) by mouth daily.    Quantity:  30 tablet  Refills:  0        CONTINUE taking these medications      I your doctor about these medications      Instructions Prescription details   Gentamicin Sulfate 0.3 % Soln  Commonly known as:  GARAMYCIN      1 drop 4 (four) times daily.  Into the affected eye   Refills:  2           Where to Get Your Medications      The

## 2019-10-22 NOTE — PROGRESS NOTES
Enriquez removed at 0830. Voiding freely,post void 200 cc. NURSING DISCHARGE NOTE    Discharged Home via Wheelchair. Accompanied by Family member  Belongings Taken by patient/family. Reviewed discharge instructions with patient and family.

## 2019-10-23 ENCOUNTER — TELEPHONE (OUTPATIENT)
Dept: INTERNAL MEDICINE CLINIC | Facility: CLINIC | Age: 84
End: 2019-10-23

## 2019-10-23 ENCOUNTER — PATIENT OUTREACH (OUTPATIENT)
Dept: CASE MANAGEMENT | Age: 84
End: 2019-10-23

## 2019-10-23 DIAGNOSIS — Z02.9 ENCOUNTERS FOR UNSPECIFIED ADMINISTRATIVE PURPOSE: ICD-10-CM

## 2019-10-23 DIAGNOSIS — N12 PYELONEPHRITIS: ICD-10-CM

## 2019-10-23 PROCEDURE — 1111F DSCHRG MED/CURRENT MED MERGE: CPT

## 2019-10-23 NOTE — TELEPHONE ENCOUNTER
Called patient to make a hospital follow up for Dr Alison Narayanan. Patient stated that he could not come in on any of the days we offered before 11/01.  Patient stated that he will call us to make an appointment

## 2019-10-23 NOTE — PROGRESS NOTES
NCM attempted to contact the patient for hospital follow up. Phone rang once followed by busy signal and disconnected. NCM will try again later.

## 2019-10-24 PROBLEM — R39.12 BENIGN PROSTATIC HYPERPLASIA WITH WEAK URINARY STREAM: Status: ACTIVE | Noted: 2019-10-24

## 2019-10-24 PROBLEM — N20.0 KIDNEY STONE: Status: ACTIVE | Noted: 2019-02-20

## 2019-10-24 PROBLEM — R35.1 NOCTURIA: Status: ACTIVE | Noted: 2019-10-24

## 2019-10-24 PROBLEM — N40.1 BENIGN PROSTATIC HYPERPLASIA WITH WEAK URINARY STREAM: Status: ACTIVE | Noted: 2019-10-24

## 2019-10-24 PROBLEM — R33.9 URINARY RETENTION: Status: ACTIVE | Noted: 2019-10-24

## 2019-10-24 NOTE — PROGRESS NOTES
Initial Post Discharge Follow Up   Discharge Date: 10/22/19  Contact Date: 10/23/2019    Consent Verification:  Assessment Completed With: Patient  HIPAA Verified?   Yes    Discharge Dx:  Acute pyelonephritis    Was TCC ordered: no    General:   • How ha as needed for FLATULENCE., Disp: , Rfl:   Terazosin HCl 10 MG Oral Cap, Take 10 mg by mouth nightly., Disp: , Rfl:   Polyethyl Glycol-Propyl Glycol (SYSTANE OP), Apply to eye., Disp: , Rfl:   TURMERIC OR, Take 1 capsule by mouth daily. , Disp: , Rfl:   Merl Flurry saw urology this morning already and does not feel the need to f/u with PCP as well. Educated pt on the importance of close f/u with PCP as well. Pt verbalized understanding and states he will call office back when he's ready to schedule.      Your appoin

## 2019-10-31 ENCOUNTER — OFFICE VISIT (OUTPATIENT)
Dept: INTERNAL MEDICINE CLINIC | Facility: CLINIC | Age: 84
End: 2019-10-31
Payer: MEDICARE

## 2019-10-31 VITALS
TEMPERATURE: 97 F | DIASTOLIC BLOOD PRESSURE: 70 MMHG | WEIGHT: 202 LBS | HEART RATE: 62 BPM | BODY MASS INDEX: 29 KG/M2 | SYSTOLIC BLOOD PRESSURE: 126 MMHG | RESPIRATION RATE: 18 BRPM | OXYGEN SATURATION: 97 %

## 2019-10-31 DIAGNOSIS — N12 PYELONEPHRITIS: Primary | ICD-10-CM

## 2019-10-31 DIAGNOSIS — R53.83 FATIGUE, UNSPECIFIED TYPE: ICD-10-CM

## 2019-10-31 DIAGNOSIS — E11.69 DM TYPE 2 WITH DIABETIC DYSLIPIDEMIA (HCC): ICD-10-CM

## 2019-10-31 DIAGNOSIS — I10 ELEVATED BLOOD PRESSURE READING IN OFFICE WITH DIAGNOSIS OF HYPERTENSION: ICD-10-CM

## 2019-10-31 DIAGNOSIS — E78.5 DM TYPE 2 WITH DIABETIC DYSLIPIDEMIA (HCC): ICD-10-CM

## 2019-10-31 PROCEDURE — 87086 URINE CULTURE/COLONY COUNT: CPT | Performed by: INTERNAL MEDICINE

## 2019-10-31 PROCEDURE — 99214 OFFICE O/P EST MOD 30 MIN: CPT | Performed by: INTERNAL MEDICINE

## 2019-10-31 NOTE — PROGRESS NOTES
Alvaro Melara was discharged from [facility] where he was diagnosed with pyelonephritis. Still taking antibiotics. Denies fever chills. Big concern is feels fatigued. Also his blood pressure is very unsteady. At times elevated.   He denies any chest pain s

## 2019-11-18 RX ORDER — ALFUZOSIN HYDROCHLORIDE 10 MG/1
TABLET, EXTENDED RELEASE ORAL
Qty: 30 TABLET | Refills: 0 | OUTPATIENT
Start: 2019-11-18

## 2019-11-18 RX ORDER — FINASTERIDE 5 MG/1
TABLET, FILM COATED ORAL
Qty: 30 TABLET | Refills: 0 | OUTPATIENT
Start: 2019-11-18

## 2019-11-18 NOTE — TELEPHONE ENCOUNTER
Discussed these refills with patient. He has enough medication left until Thursday. We will see his urologist on Thursday. I advised he discuss refills with his urologist and he agrees.

## 2019-12-02 ENCOUNTER — OFFICE VISIT (OUTPATIENT)
Dept: INTERNAL MEDICINE CLINIC | Facility: CLINIC | Age: 84
End: 2019-12-02
Payer: MEDICARE

## 2019-12-02 VITALS
DIASTOLIC BLOOD PRESSURE: 60 MMHG | HEART RATE: 66 BPM | SYSTOLIC BLOOD PRESSURE: 130 MMHG | RESPIRATION RATE: 16 BRPM | OXYGEN SATURATION: 95 % | BODY MASS INDEX: 28 KG/M2 | WEIGHT: 199 LBS

## 2019-12-02 DIAGNOSIS — N18.30 TYPE 2 DIABETES MELLITUS WITH STAGE 3 CHRONIC KIDNEY DISEASE, WITHOUT LONG-TERM CURRENT USE OF INSULIN (HCC): ICD-10-CM

## 2019-12-02 DIAGNOSIS — R39.12 BENIGN PROSTATIC HYPERPLASIA WITH WEAK URINARY STREAM: ICD-10-CM

## 2019-12-02 DIAGNOSIS — N40.1 BENIGN PROSTATIC HYPERPLASIA WITH WEAK URINARY STREAM: ICD-10-CM

## 2019-12-02 DIAGNOSIS — E11.69 DM TYPE 2 WITH DIABETIC DYSLIPIDEMIA (HCC): ICD-10-CM

## 2019-12-02 DIAGNOSIS — E78.5 DM TYPE 2 WITH DIABETIC DYSLIPIDEMIA (HCC): ICD-10-CM

## 2019-12-02 DIAGNOSIS — E11.22 TYPE 2 DIABETES MELLITUS WITH STAGE 3 CHRONIC KIDNEY DISEASE, WITHOUT LONG-TERM CURRENT USE OF INSULIN (HCC): ICD-10-CM

## 2019-12-02 DIAGNOSIS — I10 HTN, GOAL BELOW 130/80: Primary | ICD-10-CM

## 2019-12-02 PROCEDURE — 99214 OFFICE O/P EST MOD 30 MIN: CPT | Performed by: INTERNAL MEDICINE

## 2019-12-02 RX ORDER — PENICILLIN V POTASSIUM 500 MG/1
500 TABLET ORAL 4 TIMES DAILY
COMMUNITY
End: 2020-02-03 | Stop reason: ALTCHOICE

## 2019-12-02 NOTE — PROGRESS NOTES
Problem 1 hypertension he denies headaches dizziness chest pain or shortness of breath. Home blood pressure readings were reviewed and within acceptable range.   Problem over 2 diabetes mellitus with chronic kidney disease problem 3 diabetes mellitus with

## 2019-12-27 RX ORDER — AZITHROMYCIN 500 MG/1
500 TABLET, FILM COATED ORAL DAILY
Qty: 5 TABLET | Refills: 0 | Status: SHIPPED | OUTPATIENT
Start: 2019-12-27 | End: 2020-02-03 | Stop reason: ALTCHOICE

## 2020-01-02 ENCOUNTER — TELEPHONE (OUTPATIENT)
Dept: INTERNAL MEDICINE CLINIC | Facility: CLINIC | Age: 85
End: 2020-01-02

## 2020-01-02 NOTE — TELEPHONE ENCOUNTER
Patient was taken to [facility] with a broken right femur and had a shona put in. Patient states that he is unsure of discharge date at this time but will be going to [facility] for rehab but unsure how that works.     I informed him that [facility]  would

## 2020-01-06 ENCOUNTER — EXTERNAL FACILITY (OUTPATIENT)
Dept: INTERNAL MEDICINE CLINIC | Facility: CLINIC | Age: 85
End: 2020-01-06

## 2020-01-06 DIAGNOSIS — S72.91XA CLOSED FRACTURE OF RIGHT FEMUR, UNSPECIFIED FRACTURE MORPHOLOGY, UNSPECIFIED PORTION OF FEMUR, INITIAL ENCOUNTER (HCC): Primary | ICD-10-CM

## 2020-01-06 DIAGNOSIS — I10 HTN, GOAL BELOW 130/80: ICD-10-CM

## 2020-01-06 DIAGNOSIS — E11.22 TYPE 2 DIABETES MELLITUS WITH STAGE 3 CHRONIC KIDNEY DISEASE, WITHOUT LONG-TERM CURRENT USE OF INSULIN (HCC): ICD-10-CM

## 2020-01-06 DIAGNOSIS — N18.30 TYPE 2 DIABETES MELLITUS WITH STAGE 3 CHRONIC KIDNEY DISEASE, WITHOUT LONG-TERM CURRENT USE OF INSULIN (HCC): ICD-10-CM

## 2020-01-06 PROCEDURE — 99305 1ST NF CARE MODERATE MDM 35: CPT | Performed by: INTERNAL MEDICINE

## 2020-01-06 NOTE — PROGRESS NOTES
Is a very pleasant individual.  On the evening before New Year's Rosamaria he was at a party dancing fell and fractured his right femur. He was sent to [facility] where he had a shona inserted. He is now been transferred to the [facility].   States pain is adequately contr

## 2020-01-07 NOTE — PROGRESS NOTES
Patient informed that he is on TriCor. He should not be on this medication. I gave orders to the nurse. Will check a lipid panel in the morning also an A1c.

## 2020-01-08 ENCOUNTER — NURSE ONLY (OUTPATIENT)
Dept: LAB | Age: 85
End: 2020-01-08
Attending: INTERNAL MEDICINE
Payer: MEDICARE

## 2020-01-08 DIAGNOSIS — Z47.89 UNSPECIFIED ORTHOPEDIC AFTERCARE: Primary | ICD-10-CM

## 2020-01-08 DIAGNOSIS — E11.22 TYPE 2 DIABETES MELLITUS WITH ESRD (END-STAGE RENAL DISEASE) (HCC): ICD-10-CM

## 2020-01-08 DIAGNOSIS — N18.6 TYPE 2 DIABETES MELLITUS WITH ESRD (END-STAGE RENAL DISEASE) (HCC): ICD-10-CM

## 2020-01-08 LAB
CHOLEST SMN-MCNC: 89 MG/DL (ref ?–200)
EST. AVERAGE GLUCOSE BLD GHB EST-MCNC: 123 MG/DL (ref 68–126)
HBA1C MFR BLD HPLC: 5.9 % (ref ?–5.7)
HDLC SERPL-MCNC: 28 MG/DL (ref 40–59)
LDLC SERPL CALC-MCNC: 49 MG/DL (ref ?–100)
NONHDLC SERPL-MCNC: 61 MG/DL (ref ?–130)
PATIENT FASTING Y/N/NP: YES
TRIGL SERPL-MCNC: 58 MG/DL (ref 30–149)
VLDLC SERPL CALC-MCNC: 12 MG/DL (ref 0–30)

## 2020-01-08 PROCEDURE — 83036 HEMOGLOBIN GLYCOSYLATED A1C: CPT

## 2020-01-08 PROCEDURE — 80061 LIPID PANEL: CPT

## 2020-01-09 ENCOUNTER — TELEPHONE (OUTPATIENT)
Dept: INTERNAL MEDICINE CLINIC | Facility: CLINIC | Age: 85
End: 2020-01-09

## 2020-01-09 RX ORDER — CYCLOBENZAPRINE HCL 5 MG
5 TABLET ORAL 3 TIMES DAILY PRN
Qty: 30 TABLET | Refills: 0 | Status: SHIPPED | OUTPATIENT
Start: 2020-01-09 | End: 2020-01-10

## 2020-01-09 NOTE — TELEPHONE ENCOUNTER
C/o muscle spasms right leg - nurse requests a muscle relaxant.     Patient takes Norco every 4 hours    Nurse asks if there is any new orders due to recent lab results

## 2020-01-09 NOTE — TELEPHONE ENCOUNTER
Daughter would like to meet with you in her dad's room at [facility] tomorrow morning at 8:30 when you are doing rounds.     She would like to discuss his pain medication and also some medication discrepincies that may have happened during his stay at [facility]

## 2020-01-10 RX ORDER — ENALAPRIL MALEATE 5 MG/1
20 TABLET ORAL DAILY
COMMUNITY
End: 2020-02-03 | Stop reason: DRUGHIGH

## 2020-01-10 NOTE — PROGRESS NOTES
Had a meeting with Deirdre Adams and his daughter. They had [facility]. When he returned to the hospital to add him on allopurinol. He is never had a bout of gout.   However many years ago he was on some sort of medication that

## 2020-01-11 RX ORDER — HYDROCODONE BITARTRATE AND ACETAMINOPHEN 5; 325 MG/1; MG/1
1 TABLET ORAL EVERY 4 HOURS PRN
Qty: 60 TABLET | Refills: 0 | Status: SHIPPED | OUTPATIENT
Start: 2020-01-11 | End: 2020-02-03

## 2020-01-11 RX ORDER — HYDROCODONE BITARTRATE AND ACETAMINOPHEN 5; 325 MG/1; MG/1
1 TABLET ORAL EVERY 4 HOURS PRN
Qty: 18 TABLET | Refills: 0 | Status: SHIPPED | OUTPATIENT
Start: 2020-01-11 | End: 2020-02-03

## 2020-01-14 ENCOUNTER — EXTERNAL FACILITY (OUTPATIENT)
Dept: INTERNAL MEDICINE CLINIC | Facility: CLINIC | Age: 85
End: 2020-01-14

## 2020-01-14 DIAGNOSIS — S72.91XA CLOSED FRACTURE OF RIGHT FEMUR, UNSPECIFIED FRACTURE MORPHOLOGY, UNSPECIFIED PORTION OF FEMUR, INITIAL ENCOUNTER (HCC): Primary | ICD-10-CM

## 2020-01-14 PROCEDURE — 99307 SBSQ NF CARE SF MDM 10: CPT | Performed by: INTERNAL MEDICINE

## 2020-01-14 NOTE — PROGRESS NOTES
Patient has no complaints. Pain adequately controlled. Continue with physical therapy. Physical examination pleasant alert well orientated no acute distress. Normocephalic nonicteric lungs clear abdomen soft nontender.     Impression status post open

## 2020-01-20 ENCOUNTER — TELEPHONE (OUTPATIENT)
Dept: INTERNAL MEDICINE CLINIC | Facility: CLINIC | Age: 85
End: 2020-01-20

## 2020-01-21 ENCOUNTER — EXTERNAL FACILITY (OUTPATIENT)
Dept: INTERNAL MEDICINE CLINIC | Facility: CLINIC | Age: 85
End: 2020-01-21

## 2020-01-21 DIAGNOSIS — S72.91XA CLOSED FRACTURE OF RIGHT FEMUR, UNSPECIFIED FRACTURE MORPHOLOGY, UNSPECIFIED PORTION OF FEMUR, INITIAL ENCOUNTER (HCC): Primary | ICD-10-CM

## 2020-01-21 DIAGNOSIS — R19.7 DIARRHEA, UNSPECIFIED TYPE: ICD-10-CM

## 2020-01-21 PROCEDURE — 99307 SBSQ NF CARE SF MDM 10: CPT | Performed by: INTERNAL MEDICINE

## 2020-01-21 NOTE — PROGRESS NOTES
Problem #1 fracture right femur status post open reduction internal fixation. Patient doing well no need for pain medication receiving physical therapy #2 frequent loose stools. Did put him on Pepto-Bismol with good results.     Physical examination pleas

## 2020-01-22 ENCOUNTER — TELEPHONE (OUTPATIENT)
Dept: INTERNAL MEDICINE CLINIC | Facility: CLINIC | Age: 85
End: 2020-01-22

## 2020-01-24 ENCOUNTER — TELEPHONE (OUTPATIENT)
Dept: INTERNAL MEDICINE CLINIC | Facility: CLINIC | Age: 85
End: 2020-01-24

## 2020-01-24 DIAGNOSIS — S72.92XD CLOSED FRACTURE OF LEFT FEMUR WITH ROUTINE HEALING, UNSPECIFIED FRACTURE MORPHOLOGY, UNSPECIFIED PORTION OF FEMUR, SUBSEQUENT ENCOUNTER: Primary | ICD-10-CM

## 2020-01-27 ENCOUNTER — TELEPHONE (OUTPATIENT)
Dept: INTERNAL MEDICINE CLINIC | Facility: CLINIC | Age: 85
End: 2020-01-27

## 2020-01-27 NOTE — TELEPHONE ENCOUNTER
Patient and daughter will call back to schedule. Home Health is coming in today and they will be arranging PT and OT appointments.     Patient was upset because [facility] was supposed to send him home with a months worth of 2 different medications but th

## 2020-01-28 RX ORDER — RIVAROXABAN 10 MG/1
TABLET, FILM COATED ORAL
Qty: 5 TABLET | Refills: 0 | Status: SHIPPED | OUTPATIENT
Start: 2020-01-28 | End: 2020-02-03 | Stop reason: ALTCHOICE

## 2020-02-03 ENCOUNTER — OFFICE VISIT (OUTPATIENT)
Dept: INTERNAL MEDICINE CLINIC | Facility: CLINIC | Age: 85
End: 2020-02-03
Payer: MEDICARE

## 2020-02-03 VITALS
OXYGEN SATURATION: 96 % | WEIGHT: 196.88 LBS | HEART RATE: 62 BPM | SYSTOLIC BLOOD PRESSURE: 136 MMHG | DIASTOLIC BLOOD PRESSURE: 62 MMHG | TEMPERATURE: 98 F | RESPIRATION RATE: 17 BRPM | BODY MASS INDEX: 28 KG/M2

## 2020-02-03 DIAGNOSIS — S72.91XA CLOSED FRACTURE OF RIGHT FEMUR, UNSPECIFIED FRACTURE MORPHOLOGY, UNSPECIFIED PORTION OF FEMUR, INITIAL ENCOUNTER (HCC): ICD-10-CM

## 2020-02-03 DIAGNOSIS — H00.011 HORDEOLUM EXTERNUM OF RIGHT UPPER EYELID: ICD-10-CM

## 2020-02-03 DIAGNOSIS — R73.01 IFG (IMPAIRED FASTING GLUCOSE): Primary | ICD-10-CM

## 2020-02-03 PROBLEM — D64.9 POSTOPERATIVE ANEMIA: Status: ACTIVE | Noted: 2019-12-31

## 2020-02-03 PROBLEM — S72.001A CLOSED RIGHT HIP FRACTURE, INITIAL ENCOUNTER (HCC): Status: ACTIVE | Noted: 2019-12-30

## 2020-02-03 PROCEDURE — 99214 OFFICE O/P EST MOD 30 MIN: CPT | Performed by: INTERNAL MEDICINE

## 2020-02-03 RX ORDER — PREDNISOLONE ACETATE 10 MG/ML
SUSPENSION/ DROPS OPHTHALMIC
COMMUNITY
Start: 2020-01-31 | End: 2020-03-04

## 2020-02-03 RX ORDER — ENALAPRIL MALEATE 20 MG/1
20 TABLET ORAL DAILY
COMMUNITY
End: 2020-02-04

## 2020-02-03 RX ORDER — OFLOXACIN 3 MG/ML
SOLUTION/ DROPS OPHTHALMIC
COMMUNITY
Start: 2020-01-31 | End: 2020-02-03

## 2020-02-03 RX ORDER — DOCUSATE SODIUM 100 MG/1
100 CAPSULE, LIQUID FILLED ORAL AS NEEDED
COMMUNITY
Start: 2020-01-02 | End: 2020-02-07

## 2020-02-03 RX ORDER — ACETAMINOPHEN 325 MG/1
650 TABLET ORAL AS NEEDED
COMMUNITY
Start: 2020-01-02

## 2020-02-03 RX ORDER — TOBRAMYCIN AND DEXAMETHASONE 3; 1 MG/ML; MG/ML
SUSPENSION/ DROPS OPHTHALMIC
COMMUNITY
Start: 2020-01-28 | End: 2020-03-04 | Stop reason: ALTCHOICE

## 2020-02-03 NOTE — PROGRESS NOTES
Jerona Prader is been transferred to independent living from the [facility]. He was still receiving physical therapy. Had questions regarding his medications.   We did go his medications with him and we gave him a copy of what medication he should be taking currently

## 2020-02-04 RX ORDER — ENALAPRIL MALEATE 20 MG/1
20 TABLET ORAL DAILY
Qty: 90 TABLET | Refills: 3 | Status: SHIPPED | OUTPATIENT
Start: 2020-02-04 | End: 2020-02-07

## 2020-02-04 RX ORDER — TERAZOSIN 10 MG/1
10 CAPSULE ORAL NIGHTLY
COMMUNITY
End: 2020-03-04

## 2020-03-03 ENCOUNTER — TELEPHONE (OUTPATIENT)
Dept: INTERNAL MEDICINE CLINIC | Facility: CLINIC | Age: 85
End: 2020-03-03

## 2020-03-03 NOTE — TELEPHONE ENCOUNTER
Was calling due to patients BP being 181/79 before therapy.  Patient states that his medication was changed

## 2020-03-04 ENCOUNTER — OFFICE VISIT (OUTPATIENT)
Dept: INTERNAL MEDICINE CLINIC | Facility: CLINIC | Age: 85
End: 2020-03-04
Payer: MEDICARE

## 2020-03-04 VITALS
BODY MASS INDEX: 28 KG/M2 | WEIGHT: 198.69 LBS | OXYGEN SATURATION: 100 % | SYSTOLIC BLOOD PRESSURE: 112 MMHG | DIASTOLIC BLOOD PRESSURE: 70 MMHG | HEART RATE: 60 BPM | TEMPERATURE: 97 F | RESPIRATION RATE: 17 BRPM

## 2020-03-04 DIAGNOSIS — L29.9 ITCH: ICD-10-CM

## 2020-03-04 DIAGNOSIS — I10 ELEVATED BLOOD PRESSURE READING IN OFFICE WITH DIAGNOSIS OF HYPERTENSION: Primary | ICD-10-CM

## 2020-03-04 PROCEDURE — 99214 OFFICE O/P EST MOD 30 MIN: CPT | Performed by: INTERNAL MEDICINE

## 2020-03-04 RX ORDER — ACETAMINOPHEN 160 MG
2000 TABLET,DISINTEGRATING ORAL DAILY
COMMUNITY

## 2020-03-04 RX ORDER — ENALAPRIL MALEATE 20 MG/1
20 TABLET ORAL 2 TIMES DAILY
COMMUNITY

## 2020-03-04 NOTE — PROGRESS NOTES
Tone's been taking his blood pressure at home. The systolic ranges in the 611-034 range. Always takes it first thing in the morning. He denies any chest chest pain shortness of breath. He does complain a dry itchy skin especially in his arms.   He does

## 2020-03-16 ENCOUNTER — OFFICE VISIT (OUTPATIENT)
Dept: INTERNAL MEDICINE CLINIC | Facility: CLINIC | Age: 85
End: 2020-03-16
Payer: MEDICARE

## 2020-03-16 VITALS
RESPIRATION RATE: 18 BRPM | BODY MASS INDEX: 28 KG/M2 | HEART RATE: 62 BPM | WEIGHT: 199 LBS | TEMPERATURE: 97 F | SYSTOLIC BLOOD PRESSURE: 125 MMHG | OXYGEN SATURATION: 98 % | DIASTOLIC BLOOD PRESSURE: 70 MMHG

## 2020-03-16 DIAGNOSIS — I10 ELEVATED BLOOD PRESSURE READING IN OFFICE WITH DIAGNOSIS OF HYPERTENSION: Primary | ICD-10-CM

## 2020-03-16 PROCEDURE — 99213 OFFICE O/P EST LOW 20 MIN: CPT | Performed by: INTERNAL MEDICINE

## 2020-03-16 RX ORDER — AMOXICILLIN 500 MG/1
TABLET, FILM COATED ORAL
COMMUNITY
Start: 2020-03-09

## 2020-03-16 RX ORDER — FUROSEMIDE 20 MG/1
20 TABLET ORAL 2 TIMES DAILY
Qty: 30 TABLET | Refills: 11 | Status: SHIPPED | OUTPATIENT
Start: 2020-03-16 | End: 2021-09-13

## 2020-03-16 NOTE — PROGRESS NOTES
Tone's home blood pressure readings were elevated. We did check him today twice and was normal.  I however did check mild blood pressure with his machine and found it to be accurate. Patient was on terra Zosyn.   This was discontinued by his urologist.  I

## 2020-03-30 ENCOUNTER — TELEPHONE (OUTPATIENT)
Dept: INTERNAL MEDICINE CLINIC | Facility: CLINIC | Age: 85
End: 2020-03-30

## 2020-04-07 ENCOUNTER — MED REC SCAN ONLY (OUTPATIENT)
Dept: INTERNAL MEDICINE CLINIC | Facility: CLINIC | Age: 85
End: 2020-04-07

## 2020-05-28 ENCOUNTER — OFFICE VISIT (OUTPATIENT)
Dept: INTERNAL MEDICINE CLINIC | Facility: CLINIC | Age: 85
End: 2020-05-28
Payer: MEDICARE

## 2020-05-28 VITALS
DIASTOLIC BLOOD PRESSURE: 58 MMHG | HEIGHT: 70.2 IN | RESPIRATION RATE: 18 BRPM | HEART RATE: 55 BPM | WEIGHT: 196.81 LBS | OXYGEN SATURATION: 97 % | BODY MASS INDEX: 28.18 KG/M2 | SYSTOLIC BLOOD PRESSURE: 128 MMHG | TEMPERATURE: 98 F

## 2020-05-28 DIAGNOSIS — I10 ESSENTIAL HYPERTENSION: Primary | ICD-10-CM

## 2020-05-28 DIAGNOSIS — M54.9 PAIN OF MID BACK: ICD-10-CM

## 2020-05-28 DIAGNOSIS — S72.91XA CLOSED FRACTURE OF RIGHT FEMUR, UNSPECIFIED FRACTURE MORPHOLOGY, UNSPECIFIED PORTION OF FEMUR, INITIAL ENCOUNTER (HCC): ICD-10-CM

## 2020-05-28 DIAGNOSIS — Z12.5 SCREENING FOR PROSTATE CANCER: ICD-10-CM

## 2020-05-28 DIAGNOSIS — R73.01 IFG (IMPAIRED FASTING GLUCOSE): ICD-10-CM

## 2020-05-28 PROCEDURE — 84154 ASSAY OF PSA FREE: CPT | Performed by: UROLOGY

## 2020-05-28 PROCEDURE — 99214 OFFICE O/P EST MOD 30 MIN: CPT | Performed by: INTERNAL MEDICINE

## 2020-05-28 PROCEDURE — 85025 COMPLETE CBC W/AUTO DIFF WBC: CPT | Performed by: INTERNAL MEDICINE

## 2020-05-28 PROCEDURE — 84153 ASSAY OF PSA TOTAL: CPT | Performed by: UROLOGY

## 2020-05-28 PROCEDURE — 83036 HEMOGLOBIN GLYCOSYLATED A1C: CPT | Performed by: INTERNAL MEDICINE

## 2020-05-28 PROCEDURE — 80053 COMPREHEN METABOLIC PANEL: CPT | Performed by: UROLOGY

## 2020-05-28 RX ORDER — BLOOD SUGAR DIAGNOSTIC
1 STRIP MISCELLANEOUS DAILY
COMMUNITY
Start: 2020-03-24

## 2020-05-28 NOTE — PATIENT INSTRUCTIONS
Deirdre Adams I want to take 2 of those water pills daily. Also check your blood pressure daily and bring those numbers in once a week for my review. Also call physical therapy to resume treatment.

## 2020-05-28 NOTE — PROGRESS NOTES
Problem 1 hypertension patient denies headaches dizziness chest pain or shortness of breath. Patient was prescribed furosemide 20 mg. He is to take 2 pills daily. Patient however was taken 1-1/2 pills. We did review home blood pressure readings.   About fracture right femur status post surgery. He is to get physical therapy call and resume therapy. Patient's urologist did order a PSA.     Patient is return office for an office visit in about 3 months

## 2020-05-29 ENCOUNTER — TELEPHONE (OUTPATIENT)
Dept: INTERNAL MEDICINE CLINIC | Facility: CLINIC | Age: 85
End: 2020-05-29

## 2020-05-29 DIAGNOSIS — R77.1 HYPERGLOBULINEMIA: Primary | ICD-10-CM

## 2020-05-29 DIAGNOSIS — N18.30 STAGE 3 CHRONIC KIDNEY DISEASE (HCC): Primary | ICD-10-CM

## 2020-05-29 NOTE — TELEPHONE ENCOUNTER
Cade Brown, as we discussed over the phone, after reviewing previous kidney functions I realized that your kidney function has deteriorated significantly.   We will give a referral once again to see a kidney specialist, nephrologist and, Dr. Beau Farrell

## 2020-06-08 ENCOUNTER — TELEPHONE (OUTPATIENT)
Dept: INTERNAL MEDICINE CLINIC | Facility: CLINIC | Age: 85
End: 2020-06-08

## 2020-06-08 NOTE — TELEPHONE ENCOUNTER
I reviewed Tone's blood pressure readings some of them were elevated. Will double dose of atenolol from 25 to 50 mg. He does have an appointment to see nephrology end of the month.

## 2020-06-17 ENCOUNTER — OFFICE VISIT (OUTPATIENT)
Dept: NEPHROLOGY | Facility: CLINIC | Age: 85
End: 2020-06-17
Payer: MEDICARE

## 2020-06-17 VITALS — SYSTOLIC BLOOD PRESSURE: 136 MMHG | WEIGHT: 197.81 LBS | DIASTOLIC BLOOD PRESSURE: 70 MMHG | BODY MASS INDEX: 28 KG/M2

## 2020-06-17 DIAGNOSIS — N18.30 CKD (CHRONIC KIDNEY DISEASE) STAGE 3, GFR 30-59 ML/MIN (HCC): Primary | ICD-10-CM

## 2020-06-17 DIAGNOSIS — IMO0002 SOLITARY KIDNEY: ICD-10-CM

## 2020-06-17 DIAGNOSIS — I10 ESSENTIAL HYPERTENSION: ICD-10-CM

## 2020-06-17 PROCEDURE — 99214 OFFICE O/P EST MOD 30 MIN: CPT | Performed by: INTERNAL MEDICINE

## 2020-06-17 NOTE — PROGRESS NOTES
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 Previous evaluation for other etiologies unrevealing. Davon   • REMOVAL OF EYE Right 2012   • SHOULDER SURG PROC UNLISTED Right 1990s      Family History   Problem Relation Age of Onset   • Other (Other) Father         AGE   • Ulcerative Colitis Mother    • Hypertension Mother    • Other (Other) Mother Allergies:    Simvastatin             ITCHING  Levofloxacin            INSOMNIA    ROS:     Denies fever/chills  Denies wt loss/gain  Denies HA or visual changes  Denies CP or palpitations  Denies SOB/cough/hemoptysis  Denies abd or flank pain  Denie

## 2020-07-10 ENCOUNTER — NURSE ONLY (OUTPATIENT)
Dept: INTERNAL MEDICINE CLINIC | Facility: CLINIC | Age: 85
End: 2020-07-10
Payer: MEDICARE

## 2020-07-10 DIAGNOSIS — R77.1 HYPERGLOBULINEMIA: ICD-10-CM

## 2020-07-10 PROCEDURE — 84165 PROTEIN E-PHORESIS SERUM: CPT | Performed by: INTERNAL MEDICINE

## 2020-07-10 PROCEDURE — 86334 IMMUNOFIX E-PHORESIS SERUM: CPT | Performed by: INTERNAL MEDICINE

## 2020-07-10 PROCEDURE — 83883 ASSAY NEPHELOMETRY NOT SPEC: CPT | Performed by: INTERNAL MEDICINE

## 2020-07-13 DIAGNOSIS — R76.8 ELEVATED SERUM IMMUNOGLOBULIN FREE LIGHT CHAINS: Primary | ICD-10-CM

## 2020-07-13 LAB
KAPPA FREE LIGHT CHAIN: 9.25 MG/DL (ref 0.33–1.94)
KAPPA/LAMBDA FLC RATIO: 2.27 (ref 0.26–1.65)
LAMBDA FREE LIGHT CHAIN: 4.08 MG/DL (ref 0.57–2.63)

## 2020-07-17 LAB
ALBUMIN SERPL ELPH-MCNC: 4.36 G/DL (ref 3.75–5.21)
ALBUMIN/GLOB SERPL: 1.11 {RATIO} (ref 1–2)
ALPHA1 GLOB SERPL ELPH-MCNC: 0.27 G/DL (ref 0.19–0.46)
ALPHA2 GLOB SERPL ELPH-MCNC: 0.81 G/DL (ref 0.48–1.05)
B-GLOBULIN SERPL ELPH-MCNC: 0.82 G/DL (ref 0.68–1.23)
GAMMA GLOB SERPL ELPH-MCNC: 2.03 G/DL (ref 0.62–1.7)
M PROTEIN MFR SERPL ELPH: 8.3 G/DL (ref 6.4–8.2)

## 2020-07-24 ENCOUNTER — OFFICE VISIT (OUTPATIENT)
Dept: HEMATOLOGY/ONCOLOGY | Facility: HOSPITAL | Age: 85
End: 2020-07-24
Attending: INTERNAL MEDICINE
Payer: MEDICARE

## 2020-07-24 VITALS
HEART RATE: 60 BPM | TEMPERATURE: 98 F | HEIGHT: 70.2 IN | WEIGHT: 201.19 LBS | OXYGEN SATURATION: 99 % | BODY MASS INDEX: 28.8 KG/M2 | RESPIRATION RATE: 16 BRPM | DIASTOLIC BLOOD PRESSURE: 74 MMHG | SYSTOLIC BLOOD PRESSURE: 162 MMHG

## 2020-07-24 DIAGNOSIS — D47.2 MONOCLONAL GAMMOPATHY: Primary | ICD-10-CM

## 2020-07-24 PROCEDURE — 99205 OFFICE O/P NEW HI 60 MIN: CPT | Performed by: INTERNAL MEDICINE

## 2020-07-24 NOTE — PROGRESS NOTES
Patient is here for MD consult for abnormal labs. Patient resides at [facility]. He had labs drawn on 7/10. Here to further discuss.        Education Record    Learner:  Patient    Disease / Diagnosis:  Consult     Barriers / Limitations:  None   Comme

## 2020-07-25 NOTE — CONSULTS
[facility]    PATIENT'S NAME: Brittnee Dick   CONSULTING PHYSICIAN: Owen Murdock M.D.    PATIENT ACCOUNT #: [de-identified] LOCATION: [address] [place] [address] RECORD #: [id number] YOB: 1935   CONSULTATION DATE: 07/24/2020       CANCER KAM He has no history of dyslipidemia.      MEDICATIONS:  His current medications include acetaminophen 650 mg p.r.n., amoxicillin prior to dental work, aspirin 81 mg nightly, atenolol 25 mg daily, enalapril 20 mg twice daily, finasteride 5 mg daily, furosemid in his left knee and his back and some pain down his right buttock. The remainder of a 10-point complete review of systems is unremarkable, with pertinent positives and negatives in the HPI.       PHYSICAL EXAMINATION:    GENERAL:  He is a well-appearing m hematologic parameters, otherwise the odds are very low that this is a more aggressive or virulent plasma cell dyscrasia. I have recommended just a repeat at 6 and 12 months.   I told him that if in fact this is an MGUS as we expect, then the likelihood is

## 2020-08-24 PROBLEM — I10 ESSENTIAL HYPERTENSION: Status: ACTIVE | Noted: 2020-08-24

## 2020-08-24 PROBLEM — D64.9 POSTOPERATIVE ANEMIA: Status: RESOLVED | Noted: 2019-12-31 | Resolved: 2020-08-24

## 2020-08-24 PROBLEM — R11.2 NON-INTRACTABLE VOMITING WITH NAUSEA, UNSPECIFIED VOMITING TYPE: Status: RESOLVED | Noted: 2019-10-18 | Resolved: 2020-08-24

## 2020-08-24 PROBLEM — N12 PYELONEPHRITIS: Status: RESOLVED | Noted: 2019-10-18 | Resolved: 2020-08-24

## 2020-08-24 PROBLEM — R33.9 URINARY RETENTION: Status: RESOLVED | Noted: 2019-10-24 | Resolved: 2020-08-24

## 2020-08-25 ENCOUNTER — OFFICE VISIT (OUTPATIENT)
Dept: INTERNAL MEDICINE CLINIC | Facility: CLINIC | Age: 85
End: 2020-08-25
Payer: MEDICARE

## 2020-08-25 VITALS
HEART RATE: 60 BPM | BODY MASS INDEX: 27.29 KG/M2 | HEIGHT: 71.89 IN | OXYGEN SATURATION: 98 % | SYSTOLIC BLOOD PRESSURE: 135 MMHG | RESPIRATION RATE: 18 BRPM | DIASTOLIC BLOOD PRESSURE: 75 MMHG | WEIGHT: 201.5 LBS | TEMPERATURE: 97 F

## 2020-08-25 DIAGNOSIS — E83.42 HYPOMAGNESEMIA: ICD-10-CM

## 2020-08-25 DIAGNOSIS — S72.041D CLOSED DISPLACED BASICERVICAL FRACTURE OF RIGHT FEMUR WITH ROUTINE HEALING: ICD-10-CM

## 2020-08-25 DIAGNOSIS — E78.5 DYSLIPIDEMIA: ICD-10-CM

## 2020-08-25 DIAGNOSIS — R73.01 IFG (IMPAIRED FASTING GLUCOSE): ICD-10-CM

## 2020-08-25 DIAGNOSIS — N18.30 STAGE 3 CHRONIC KIDNEY DISEASE (HCC): ICD-10-CM

## 2020-08-25 DIAGNOSIS — N20.0 KIDNEY STONE: ICD-10-CM

## 2020-08-25 DIAGNOSIS — S72.001A CLOSED RIGHT HIP FRACTURE, INITIAL ENCOUNTER (HCC): ICD-10-CM

## 2020-08-25 DIAGNOSIS — D47.2 MONOCLONAL GAMMOPATHIES, BENIGN: ICD-10-CM

## 2020-08-25 DIAGNOSIS — Z90.5 STATUS POST NEPHRECTOMY: Primary | ICD-10-CM

## 2020-08-25 DIAGNOSIS — R35.1 NOCTURIA: ICD-10-CM

## 2020-08-25 DIAGNOSIS — I10 ESSENTIAL HYPERTENSION: ICD-10-CM

## 2020-08-25 LAB
EST. AVERAGE GLUCOSE BLD GHB EST-MCNC: 117 MG/DL (ref 68–126)
HBA1C MFR BLD HPLC: 5.7 % (ref ?–5.7)

## 2020-08-25 PROCEDURE — G0439 PPPS, SUBSEQ VISIT: HCPCS | Performed by: INTERNAL MEDICINE

## 2020-08-25 PROCEDURE — 93000 ELECTROCARDIOGRAM COMPLETE: CPT | Performed by: INTERNAL MEDICINE

## 2020-08-25 PROCEDURE — 83036 HEMOGLOBIN GLYCOSYLATED A1C: CPT | Performed by: INTERNAL MEDICINE

## 2020-08-25 NOTE — PROGRESS NOTES
HPI:   Cassidy Cardoza is a 80year old male who presents for a Medicare Subsequent Annual Wellness visit (Pt already had Initial Annual Wellness).     No new C/O  His last annual assessment has been over 1 year: Annual Physical due on 05/20/2020         Fall Manoj Josue MD as PCP - Ngozi Gonzalez MD (NEPHROLOGY)    Patient Active Problem List:     HTN, goal below 130/80     Diabetes mellitus type 2 in nonobese [facility])     Blind right eye     Status post nephrectomy left complication  stone     Type 2 randi needed. finasteride 5 MG Oral Tab, Take 1 tablet (5 mg total) by mouth daily. atenolol 25 MG Oral Tab, Take 25 mg by mouth daily. loratadine 10 MG Oral Tab, Take 10 mg by mouth daily as needed for Allergies.   omeprazole 20 MG Oral Capsule Delayed Rele anemia  ENDOCRINE: denies thyroid history  ALL/ASTHMA: denies hx of allergy or asthma    EXAM:   /78 (BP Location: Left arm, Patient Position: Sitting, Cuff Size: large)   Pulse 60   Temp 97 °F (36.1 °C) (Temporal)   Resp 18   Ht 71.89\"   Wt 201 lb 09/29/2014   • FLU VAC High Dose 65 YRS & Older PRSV Free (79163) 10/13/2015, 09/12/2018   • FLUZONE 6 months and older PFS 0.5 ml (78294) 09/29/2014   • Fluzone Vaccine Medicare () 10/16/2013, 10/12/2015, 09/28/2016, 09/06/2017, 09/11/2018   • HEP A Value   05/28/2020 106 (H)   04/07/2015 123 (H)       Cardiovascular Disease Screening     LDL Annually LDL Cholesterol Calc (mg/dL)   Date Value   07/11/2014 67     LDL Cholesterol (mg/dL)   Date Value   01/08/2020 49     LDL CHOLESTROL (no units)   Date Medically needed    Zoster   Not covered by Medicare Part B No vaccine history found This may be covered with your pharmacy  prescription benefits      [address] [facility] Internal Lab or Procedure External Lab or Procedure      Annual Monitoring o

## 2020-08-25 NOTE — PROGRESS NOTES
HPI:   Yuniel Yang is a 80year old male who presents for a     No new C/O  His last annual assessment has been over 1 year: Annual Physical due on 05/20/2020         Fall/Risk Assessment Update needed    Last Fall risk screen was either > 7 days ago and Last 3 Encounters:  08/25/20 : 201 lb 8 oz (91.4 kg)  07/24/20 : 201 lb 3.2 oz (91.3 kg)  06/17/20 : 197 lb 12.8 oz (89.7 kg)     Last Cholesterol Labs:   Lab Results   Component Value Date    CHOLEST 89 01/08/2020    HDL 28 (L) 01/08/2020    LDL 49 01/08/ MEDICAL INFORMATION:   He  has a past medical history of ALLERGIC RHINITIS and HYPERTENSION.     He  has a past surgical history that includes knee replacement surgery; cataract; colonoscopy; nephrectomy; removal of eye (Right, 2012); cholecystectomy (198 abnormality, atraumatic   Eyes:   Right eye prosthesis   Ears:  Normal TM's and external ear canals, both ears   Nose: Nares normal, septum midline, mucosa normal, no drainage or sinus tenderness   Throat: Lips, mucosa, and tongue normal; teeth and gums no resolved #6 hypertension well controlled #7 status post fracture right femur–shona insertion #8 chronic kidney disease stage III stable #4 dyslipidemia stable #10 impaired fasting glucose check A1c  PLAN SUMMARY:   Diagnoses and all orders for this visit: 07/11/2014 67     LDL Cholesterol (mg/dL)   Date Value   01/08/2020 49     LDL CHOLESTROL (no units)   Date Value   02/18/2016 58        EKG - w/ Initial Preventative Physical Exam only, or if medically necessary Electrocardiogram date04/30/2018    Color benefits      SPECIFIC DISEASE MONITORING Internal Lab or Procedure External Lab or Procedure      Annual Monitoring of Persistent     Medications (ACE/ARB, digoxin diuretics, anticonvulsants.)    Potassium  Annually Potassium (mmol/L)   Date Value   05/28

## 2020-10-07 ENCOUNTER — TELEPHONE (OUTPATIENT)
Dept: NEPHROLOGY | Facility: CLINIC | Age: 85
End: 2020-10-07

## 2020-10-22 ENCOUNTER — OFFICE VISIT (OUTPATIENT)
Dept: NEPHROLOGY | Facility: CLINIC | Age: 85
End: 2020-10-22
Payer: MEDICARE

## 2020-10-22 VITALS — SYSTOLIC BLOOD PRESSURE: 148 MMHG | BODY MASS INDEX: 27 KG/M2 | DIASTOLIC BLOOD PRESSURE: 74 MMHG | WEIGHT: 201.81 LBS

## 2020-10-22 DIAGNOSIS — R10.9 FLANK PAIN: Primary | ICD-10-CM

## 2020-10-22 DIAGNOSIS — N18.30 STAGE 3 CHRONIC KIDNEY DISEASE, UNSPECIFIED WHETHER STAGE 3A OR 3B CKD (HCC): ICD-10-CM

## 2020-10-22 DIAGNOSIS — N20.0 NEPHROLITHIASIS: ICD-10-CM

## 2020-10-22 PROCEDURE — 99214 OFFICE O/P EST MOD 30 MIN: CPT | Performed by: INTERNAL MEDICINE

## 2020-10-22 NOTE — PROGRESS NOTES
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 Previous evaluation for other etiologies unrevealing. • Other (Other) Mother         KIDNEY      Social History: Social History    Tobacco Use      Smoking status: Former Smoker        Quit date: 1961        Years since quittin.8      Smokeless tobacco: Never Used      Tobacco comment: QUIT 48 YRS changes  Denies CP or palpitations  Denies SOB/cough/hemoptysis  Denies abd or flank pain  Denies N/V/D  Denies change in urinary habits or gross hematuria  Denies LE edema  Denies skin rashes/myalgias/arthralgias      PHYSICAL EXAM:   There were no vitals

## 2020-10-30 ENCOUNTER — HOSPITAL ENCOUNTER (OUTPATIENT)
Dept: CT IMAGING | Facility: HOSPITAL | Age: 85
Discharge: HOME | End: 2020-10-30
Attending: INTERNAL MEDICINE
Payer: MEDICARE

## 2020-10-30 DIAGNOSIS — R10.9 FLANK PAIN: ICD-10-CM

## 2020-10-30 DIAGNOSIS — N20.0 NEPHROLITHIASIS: ICD-10-CM

## 2020-10-30 DIAGNOSIS — N18.30 STAGE 3 CHRONIC KIDNEY DISEASE, UNSPECIFIED WHETHER STAGE 3A OR 3B CKD (HCC): ICD-10-CM

## 2020-10-30 PROCEDURE — 74176 CT ABD & PELVIS W/O CONTRAST: CPT | Performed by: INTERNAL MEDICINE

## 2020-10-31 ENCOUNTER — TELEPHONE (OUTPATIENT)
Dept: NEPHROLOGY | Facility: CLINIC | Age: 85
End: 2020-10-31

## 2020-11-04 ENCOUNTER — TELEPHONE (OUTPATIENT)
Dept: INTERNAL MEDICINE CLINIC | Facility: CLINIC | Age: 85
End: 2020-11-04

## 2020-11-04 NOTE — TELEPHONE ENCOUNTER
Called patient due to his chart indicating that he has not received his flu vaccine yet this year.  L/M/M to call office and set up appointment

## 2020-11-16 PROBLEM — R33.9 INCOMPLETE BLADDER EMPTYING: Status: ACTIVE | Noted: 2020-11-16

## 2020-11-16 PROBLEM — N40.1 ENLARGED PROSTATE WITH LOWER URINARY TRACT SYMPTOMS (LUTS): Status: ACTIVE | Noted: 2020-11-16

## 2021-02-01 DIAGNOSIS — Z23 NEED FOR VACCINATION: ICD-10-CM

## 2021-04-13 NOTE — PROGRESS NOTES
For last 2440 hrs. patient's been complaining of fever chills feeling tired fatigue. Coughing up yellow sputum. Physical examination Srikanth Morales is wearing a sweatshirt. Does appear ill.   Normocephalic nonicteric lungs rhonchi right axilla cardiovascular sys Medical Necessity Information: It is in your best interest to select a reason for this procedure from the list below. All of these items fulfill various CMS LCD requirements except lesion extends to a margin. Include Z78.9 (Other Specified Conditions Influencing Health Status) As An Associated Diagnosis?: No Medical Necessity Clause: This procedure was medically necessary because the lesion that was treated was: Lab: [id number] Lab Facility: [address] [place] [address] Body Location Override (Optional - Billing Will Still Be Based On Selected Body Map Location If Applicable): inframandibular Surgeon (Optional): Simba Ramos MD Biopsy Photograph Reviewed: Yes Size Of Lesion In Cm: 0.8 Size Of Margin In Cm: 0.1 Anesthesia Volume In Cc: 1 Eye Clamp Note Details: An eye clamp was used during the procedure. Excision Method: Elliptical Repair Type: Complex Suturegard Retention Suture: 2-0 Nylon Retention Suture Bite Size: 3 mm Length To Time In Minutes Device Was In Place: 10 Intermediate / Complex Repair - Final Wound Length In Cm: 2.5 Undermining Type: Entire Wound Debridement Text: The wound edges were debrided prior to proceeding with the closure to facilitate wound healing. Helical Rim Text: The closure involved the helical rim. Vermilion Border Text: The closure involved the vermilion border. Nostril Rim Text: The closure involved the nostril rim. Retention Suture Text: Retention sutures were placed to support the closure and prevent dehiscence. Primary Defect Length (In Cm): 0 Suture Removal: 14 days Epidermal Closure Graft Donor Site (Optional): simple interrupted Graft Donor Site Bandage (Optional-Leave Blank If You Don't Want In Note): Steri-strips and a pressure bandage were applied to the donor site. Detail Level: Detailed Excision Depth: adipose tissue Scalpel Size: 15 blade Anesthesia Type: 2% lidocaine without epinephrine Additional Anesthesia Volume In Cc: 2 Hemostasis: Electrocautery Estimated Blood Loss (Cc): less than 5 cc Anesthesia Type: 2% lidocaine with epinephrine Epidermal Sutures: 5-0 Nylon Epidermal Closure: running Wound Care: Bacitracin Dressing: pressure dressing with telfa Suturegard Intro: Intraoperative tissue expansion was performed, utilizing the SUTUREGARD device, in order to reduce wound tension. Suturegard Body: The suture ends were repeatedly re-tightened and re-clamped to achieve the desired tissue expansion. Hemigard Intro: Due to skin fragility and wound tension, it was decided to use HEMIGARD adhesive retention suture devices to permit a linear closure. The skin was cleaned and dried for a 6cm distance away from the wound. Excessive hair, if present, was removed to allow for adhesion. Hemigard Postcare Instructions: The HEMIGARD strips are to remain completely dry for at least 5-7 days. Complex Repair Preamble Text (Leave Blank If You Do Not Want): Extensive wide undermining was performed. Intermediate Repair Preamble Text (Leave Blank If You Do Not Want): Undermining was performed with blunt dissection. Fusiform Excision Additional Text (Leave Blank If You Do Not Want): The margin was drawn around the clinically apparent lesion. A fusiform shape was then drawn on the skin incorporating the lesion and margins. Incisions were then made along these lines to the appropriate tissue plane and the lesion was extirpated. Eliptical Excision Additional Text (Leave Blank If You Do Not Want): The margin was drawn around the clinically apparent lesion. An elliptical shape was then drawn on the skin incorporating the lesion and margins. Incisions were then made along these lines to the appropriate tissue plane and the lesion was extirpated. Saucerization Excision Additional Text (Leave Blank If You Do Not Want): The margin was drawn around the clinically apparent lesion. Incisions were then made along these lines, in a tangential fashion, to the appropriate tissue plane and the lesion was extirpated. Slit Excision Additional Text (Leave Blank If You Do Not Want): A linear line was drawn on the skin overlying the lesion. An incision was made slowly until the lesion was visualized. Once visualized, the lesion was removed with blunt dissection. Excisional Biopsy Additional Text (Leave Blank If You Do Not Want): The margin was drawn around the clinically apparent lesion. An elliptical shape was then drawn on the skin incorporating the lesion and margins.  Incisions were then made along these lines to the appropriate tissue plane and the lesion was extirpated. Perilesional Excision Additional Text (Leave Blank If You Do Not Want): The margin was drawn around the clinically apparent lesion. Incisions were then made along these lines to the appropriate tissue plane and the lesion was extirpated. Repair Performed By Another Provider Text (Leave Blank If You Do Not Want): After the tissue was excised the defect was repaired by another provider. No Repair - Repaired With Adjacent Surgical Defect Text (Leave Blank If You Do Not Want): After the excision the defect was repaired concurrently with another surgical defect which was in close approximation. Advancement Flap (Single) Text: The defect edges were debeveled with a #15 scalpel blade. Given the location of the defect and the proximity to free margins a single advancement flap was deemed most appropriate. Using a sterile surgical marker, an appropriate advancement flap was drawn incorporating the defect and placing the expected incisions within the relaxed skin tension lines where possible. The area thus outlined was incised deep to adipose tissue with a #15 scalpel blade. The skin margins were undermined to an appropriate distance in all directions utilizing iris scissors. Advancement Flap (Double) Text: The defect edges were debeveled with a #15 scalpel blade. Given the location of the defect and the proximity to free margins a double advancement flap was deemed most appropriate. Using a sterile surgical marker, the appropriate advancement flaps were drawn incorporating the defect and placing the expected incisions within the relaxed skin tension lines where possible. The area thus outlined was incised deep to adipose tissue with a #15 scalpel blade. The skin margins were undermined to an appropriate distance in all directions utilizing iris scissors. Burow's Advancement Flap Text: The defect edges were debeveled with a #15 scalpel blade. Given the location of the defect and the proximity to free margins a Burow's advancement flap was deemed most appropriate. Using a sterile surgical marker, the appropriate advancement flap was drawn incorporating the defect and placing the expected incisions within the relaxed skin tension lines where possible. The area thus outlined was incised deep to adipose tissue with a #15 scalpel blade. The skin margins were undermined to an appropriate distance in all directions utilizing iris scissors. Chonodrocutaneous Helical Advancement Flap Text: The defect edges were debeveled with a #15 scalpel blade. Given the location of the defect and the proximity to free margins a chondrocutaneous helical advancement flap was deemed most appropriate. Using a sterile surgical marker, the appropriate advancement flap was drawn incorporating the defect and placing the expected incisions within the relaxed skin tension lines where possible. The area thus outlined was incised deep to adipose tissue with a #15 scalpel blade. The skin margins were undermined to an appropriate distance in all directions utilizing iris scissors. Crescentic Advancement Flap Text: The defect edges were debeveled with a #15 scalpel blade. Given the location of the defect and the proximity to free margins a crescentic advancement flap was deemed most appropriate. Using a sterile surgical marker, the appropriate advancement flap was drawn incorporating the defect and placing the expected incisions within the relaxed skin tension lines where possible. The area thus outlined was incised deep to adipose tissue with a #15 scalpel blade. The skin margins were undermined to an appropriate distance in all directions utilizing iris scissors. A-T Advancement Flap Text: The defect edges were debeveled with a #15 scalpel blade. Given the location of the defect, shape of the defect and the proximity to free margins an A-T advancement flap was deemed most appropriate. Using a sterile surgical marker, an appropriate advancement flap was drawn incorporating the defect and placing the expected incisions within the relaxed skin tension lines where possible. The area thus outlined was incised deep to adipose tissue with a #15 scalpel blade. The skin margins were undermined to an appropriate distance in all directions utilizing iris scissors. O-T Advancement Flap Text: The defect edges were debeveled with a #15 scalpel blade. Given the location of the defect, shape of the defect and the proximity to free margins an O-T advancement flap was deemed most appropriate. Using a sterile surgical marker, an appropriate advancement flap was drawn incorporating the defect and placing the expected incisions within the relaxed skin tension lines where possible. The area thus outlined was incised deep to adipose tissue with a #15 scalpel blade. The skin margins were undermined to an appropriate distance in all directions utilizing iris scissors. O-L Flap Text: The defect edges were debeveled with a #15 scalpel blade. Given the location of the defect, shape of the defect and the proximity to free margins an O-L flap was deemed most appropriate. Using a sterile surgical marker, an appropriate advancement flap was drawn incorporating the defect and placing the expected incisions within the relaxed skin tension lines where possible. The area thus outlined was incised deep to adipose tissue with a #15 scalpel blade. The skin margins were undermined to an appropriate distance in all directions utilizing iris scissors. O-Z Flap Text: The defect edges were debeveled with a #15 scalpel blade. Given the location of the defect, shape of the defect and the proximity to free margins an O-Z flap was deemed most appropriate. Using a sterile surgical marker, an appropriate transposition flap was drawn incorporating the defect and placing the expected incisions within the relaxed skin tension lines where possible. The area thus outlined was incised deep to adipose tissue with a #15 scalpel blade. The skin margins were undermined to an appropriate distance in all directions utilizing iris scissors. Double O-Z Flap Text: The defect edges were debeveled with a #15 scalpel blade. Given the location of the defect, shape of the defect and the proximity to free margins a Double O-Z flap was deemed most appropriate. Using a sterile surgical marker, an appropriate transposition flap was drawn incorporating the defect and placing the expected incisions within the relaxed skin tension lines where possible. The area thus outlined was incised deep to adipose tissue with a #15 scalpel blade. The skin margins were undermined to an appropriate distance in all directions utilizing iris scissors. V-Y Flap Text: The defect edges were debeveled with a #15 scalpel blade. Given the location of the defect, shape of the defect and the proximity to free margins a V-Y flap was deemed most appropriate. Using a sterile surgical marker, an appropriate advancement flap was drawn incorporating the defect and placing the expected incisions within the relaxed skin tension lines where possible. The area thus outlined was incised deep to adipose tissue with a #15 scalpel blade. The skin margins were undermined to an appropriate distance in all directions utilizing iris scissors. Advancement-Rotation Flap Text: The defect edges were debeveled with a #15 scalpel blade. Given the location of the defect, shape of the defect and the proximity to free margins an advancement-rotation flap was deemed most appropriate. Using a sterile surgical marker, an appropriate flap was drawn incorporating the defect and placing the expected incisions within the relaxed skin tension lines where possible. The area thus outlined was incised deep to adipose tissue with a #15 scalpel blade. The skin margins were undermined to an appropriate distance in all directions utilizing iris scissors. Mercedes Flap Text: The defect edges were debeveled with a #15 scalpel blade. Given the location of the defect, shape of the defect and the proximity to free margins a Mercedes flap was deemed most appropriate. Using a sterile surgical marker, an appropriate advancement flap was drawn incorporating the defect and placing the expected incisions within the relaxed skin tension lines where possible. The area thus outlined was incised deep to adipose tissue with a #15 scalpel blade. The skin margins were undermined to an appropriate distance in all directions utilizing iris scissors. Modified Advancement Flap Text: The defect edges were debeveled with a #15 scalpel blade. Given the location of the defect, shape of the defect and the proximity to free margins a modified advancement flap was deemed most appropriate. Using a sterile surgical marker, an appropriate advancement flap was drawn incorporating the defect and placing the expected incisions within the relaxed skin tension lines where possible. The area thus outlined was incised deep to adipose tissue with a #15 scalpel blade. The skin margins were undermined to an appropriate distance in all directions utilizing iris scissors. Mucosal Advancement Flap Text: Given the location of the defect, shape of the defect and the proximity to free margins a mucosal advancement flap was deemed most appropriate. Incisions were made with a 15 blade scalpel in the appropriate fashion along the cutaneous vermillion border and the mucosal lip. The remaining actinically damaged mucosal tissue was excised. The mucosal advancement flap was then elevated to the gingival sulcus with care taken to preserve the neurovascular structures and advanced into the primary defect. Care was taken to ensure that precise realignment of the vermilion border was achieved. Peng Advancement Flap Text: The defect edges were debeveled with a #15 scalpel blade. Given the location of the defect, shape of the defect and the proximity to free margins a Peng advancement flap was deemed most appropriate. Using a sterile surgical marker, an appropriate advancement flap was drawn incorporating the defect and placing the expected incisions within the relaxed skin tension lines where possible. The area thus outlined was incised deep to adipose tissue with a #15 scalpel blade. The skin margins were undermined to an appropriate distance in all directions utilizing iris scissors. Hatchet Flap Text: The defect edges were debeveled with a #15 scalpel blade. Given the location of the defect, shape of the defect and the proximity to free margins a hatchet flap was deemed most appropriate. Using a sterile surgical marker, an appropriate hatchet flap was drawn incorporating the defect and placing the expected incisions within the relaxed skin tension lines where possible. The area thus outlined was incised deep to adipose tissue with a #15 scalpel blade. The skin margins were undermined to an appropriate distance in all directions utilizing iris scissors. Rotation Flap Text: The defect edges were debeveled with a #15 scalpel blade. Given the location of the defect, shape of the defect and the proximity to free margins a rotation flap was deemed most appropriate. Using a sterile surgical marker, an appropriate rotation flap was drawn incorporating the defect and placing the expected incisions within the relaxed skin tension lines where possible. The area thus outlined was incised deep to adipose tissue with a #15 scalpel blade. The skin margins were undermined to an appropriate distance in all directions utilizing iris scissors. Spiral Flap Text: The defect edges were debeveled with a #15 scalpel blade. Given the location of the defect, shape of the defect and the proximity to free margins a spiral flap was deemed most appropriate. Using a sterile surgical marker, an appropriate rotation flap was drawn incorporating the defect and placing the expected incisions within the relaxed skin tension lines where possible. The area thus outlined was incised deep to adipose tissue with a #15 scalpel blade. The skin margins were undermined to an appropriate distance in all directions utilizing iris scissors. Star Wedge Flap Text: The defect edges were debeveled with a #15 scalpel blade. Given the location of the defect, shape of the defect and the proximity to free margins a star wedge flap was deemed most appropriate. Using a sterile surgical marker, an appropriate rotation flap was drawn incorporating the defect and placing the expected incisions within the relaxed skin tension lines where possible. The area thus outlined was incised deep to adipose tissue with a #15 scalpel blade. The skin margins were undermined to an appropriate distance in all directions utilizing iris scissors. Transposition Flap Text: The defect edges were debeveled with a #15 scalpel blade. Given the location of the defect and the proximity to free margins a transposition flap was deemed most appropriate. Using a sterile surgical marker, an appropriate transposition flap was drawn incorporating the defect. The area thus outlined was incised deep to adipose tissue with a #15 scalpel blade. The skin margins were undermined to an appropriate distance in all directions utilizing iris scissors. Muscle Hinge Flap Text: The defect edges were debeveled with a #15 scalpel blade. Given the size, depth and location of the defect and the proximity to free margins a muscle hinge flap was deemed most appropriate. Using a sterile surgical marker, an appropriate hinge flap was drawn incorporating the defect. The area thus outlined was incised with a #15 scalpel blade. The skin margins were undermined to an appropriate distance in all directions utilizing iris scissors. Nasal Turnover Hinge Flap Text: The defect edges were debeveled with a #15 scalpel blade. Given the size, depth, location of the defect and the defect being full thickness a nasal turnover hinge flap was deemed most appropriate. Using a sterile surgical marker, an appropriate hinge flap was drawn incorporating the defect. The area thus outlined was incised with a #15 scalpel blade. The flap was designed to recreate the nasal mucosal lining and the alar rim. The skin margins were undermined to an appropriate distance in all directions utilizing iris scissors. Nasalis-Muscle-Based Myocutaneous Island Pedicle Flap Text: Using a #15 blade, an incision was made around the donor flap to the level of the nasalis muscle. Wide lateral undermining was then performed in both the subcutaneous plane above the nasalis muscle, and in a submuscular plane just above periosteum. This allowed the formation of a free nasalis muscle axial pedicle (based on the angular artery) which was still attached to the actual cutaneous flap, increasing its mobility and vascular viability. Hemostasis was obtained with pinpoint electrocoagulation. The flap was mobilized into position and the pivotal anchor points positioned and stabilized with buried interrupted sutures. Subcutaneous and dermal tissues were closed in a multilayered fashion with sutures. Tissue redundancies were excised, and the epidermal edges were apposed without significant tension and sutured with sutures. Orbicularis Oris Muscle Flap Text: The defect edges were debeveled with a #15 scalpel blade. Given that the defect affected the competency of the oral sphincter an obicularis oris muscle flap was deemed most appropriate to restore this competency and normal muscle function. Using a sterile surgical marker, an appropriate flap was drawn incorporating the defect. The area thus outlined was incised with a #15 scalpel blade. Melolabial Transposition Flap Text: The defect edges were debeveled with a #15 scalpel blade. Given the location of the defect and the proximity to free margins a melolabial flap was deemed most appropriate. Using a sterile surgical marker, an appropriate melolabial transposition flap was drawn incorporating the defect. The area thus outlined was incised deep to adipose tissue with a #15 scalpel blade. The skin margins were undermined to an appropriate distance in all directions utilizing iris scissors. Rhombic Flap Text: The defect edges were debeveled with a #15 scalpel blade. Given the location of the defect and the proximity to free margins a rhombic flap was deemed most appropriate. Using a sterile surgical marker, an appropriate rhombic flap was drawn incorporating the defect. The area thus outlined was incised deep to adipose tissue with a #15 scalpel blade. The skin margins were undermined to an appropriate distance in all directions utilizing iris scissors. Rhomboid Transposition Flap Text: The defect edges were debeveled with a #15 scalpel blade. Given the location of the defect and the proximity to free margins a rhomboid transposition flap was deemed most appropriate. Using a sterile surgical marker, an appropriate rhomboid flap was drawn incorporating the defect. The area thus outlined was incised deep to adipose tissue with a #15 scalpel blade. The skin margins were undermined to an appropriate distance in all directions utilizing iris scissors. Bi-Rhombic Flap Text: The defect edges were debeveled with a #15 scalpel blade. Given the location of the defect and the proximity to free margins a bi-rhombic flap was deemed most appropriate. Using a sterile surgical marker, an appropriate rhombic flap was drawn incorporating the defect. The area thus outlined was incised deep to adipose tissue with a #15 scalpel blade. The skin margins were undermined to an appropriate distance in all directions utilizing iris scissors. Helical Rim Advancement Flap Text: The defect edges were debeveled with a #15 blade scalpel. Given the location of the defect and the proximity to free margins (helical rim) a double helical rim advancement flap was deemed most appropriate. Using a sterile surgical marker, the appropriate advancement flaps were drawn incorporating the defect and placing the expected incisions between the helical rim and antihelix where possible. The area thus outlined was incised through and through with a #15 scalpel blade. With a skin hook and iris scissors, the flaps were gently and sharply undermined and freed up. Bilateral Helical Rim Advancement Flap Text: The defect edges were debeveled with a #15 blade scalpel. Given the location of the defect and the proximity to free margins (helical rim) a bilateral helical rim advancement flap was deemed most appropriate. Using a sterile surgical marker, the appropriate advancement flaps were drawn incorporating the defect and placing the expected incisions between the helical rim and antihelix where possible. The area thus outlined was incised through and through with a #15 scalpel blade. With a skin hook and iris scissors, the flaps were gently and sharply undermined and freed up. Ear Star Wedge Flap Text: The defect edges were debeveled with a #15 blade scalpel. Given the location of the defect and the proximity to free margins (helical rim) an ear star wedge flap was deemed most appropriate. Using a sterile surgical marker, the appropriate flap was drawn incorporating the defect and placing the expected incisions between the helical rim and antihelix where possible. The area thus outlined was incised through and through with a #15 scalpel blade. Banner Transposition Flap Text: The defect edges were debeveled with a #15 scalpel blade. Given the location of the defect and the proximity to free margins a Banner transposition flap was deemed most appropriate. Using a sterile surgical marker, an appropriate flap drawn around the defect. The area thus outlined was incised deep to adipose tissue with a #15 scalpel blade. The skin margins were undermined to an appropriate distance in all directions utilizing iris scissors. Bilobed Flap Text: The defect edges were debeveled with a #15 scalpel blade. Given the location of the defect and the proximity to free margins a bilobe flap was deemed most appropriate. Using a sterile surgical marker, an appropriate bilobe flap drawn around the defect. The area thus outlined was incised deep to adipose tissue with a #15 scalpel blade. The skin margins were undermined to an appropriate distance in all directions utilizing iris scissors. Bilobed Transposition Flap Text: The defect edges were debeveled with a #15 scalpel blade. Given the location of the defect and the proximity to free margins a bilobed transposition flap was deemed most appropriate. Using a sterile surgical marker, an appropriate bilobe flap drawn around the defect. The area thus outlined was incised deep to adipose tissue with a #15 scalpel blade. The skin margins were undermined to an appropriate distance in all directions utilizing iris scissors. Trilobed Flap Text: The defect edges were debeveled with a #15 scalpel blade. Given the location of the defect and the proximity to free margins a trilobed flap was deemed most appropriate. Using a sterile surgical marker, an appropriate trilobed flap drawn around the defect. The area thus outlined was incised deep to adipose tissue with a #15 scalpel blade. The skin margins were undermined to an appropriate distance in all directions utilizing iris scissors. Dorsal Nasal Flap Text: The defect edges were debeveled with a #15 scalpel blade. Given the location of the defect and the proximity to free margins a dorsal nasal flap was deemed most appropriate. Using a sterile surgical marker, an appropriate dorsal nasal flap was drawn around the defect. The area thus outlined was incised deep to adipose tissue with a #15 scalpel blade. The skin margins were undermined to an appropriate distance in all directions utilizing iris scissors. Island Pedicle Flap Text: The defect edges were debeveled with a #15 scalpel blade. Given the location of the defect, shape of the defect and the proximity to free margins an island pedicle advancement flap was deemed most appropriate. Using a sterile surgical marker, an appropriate advancement flap was drawn incorporating the defect, outlining the appropriate donor tissue and placing the expected incisions within the relaxed skin tension lines where possible. The area thus outlined was incised deep to adipose tissue with a #15 scalpel blade. The skin margins were undermined to an appropriate distance in all directions around the primary defect and laterally outward around the island pedicle utilizing iris scissors. There was minimal undermining beneath the pedicle flap. Island Pedicle Flap With Canthal Suspension Text: The defect edges were debeveled with a #15 scalpel blade. Given the location of the defect, shape of the defect and the proximity to free margins an island pedicle advancement flap was deemed most appropriate. Using a sterile surgical marker, an appropriate advancement flap was drawn incorporating the defect, outlining the appropriate donor tissue and placing the expected incisions within the relaxed skin tension lines where possible. The area thus outlined was incised deep to adipose tissue with a #15 scalpel blade. The skin margins were undermined to an appropriate distance in all directions around the primary defect and laterally outward around the island pedicle utilizing iris scissors. There was minimal undermining beneath the pedicle flap. A suspension suture was placed in the canthal tendon to prevent tension and prevent ectropion. Alar Island Pedicle Flap Text: The defect edges were debeveled with a #15 scalpel blade. Given the location of the defect, shape of the defect and the proximity to the alar rim an island pedicle advancement flap was deemed most appropriate. Using a sterile surgical marker, an appropriate advancement flap was drawn incorporating the defect, outlining the appropriate donor tissue and placing the expected incisions within the nasal ala running parallel to the alar rim. The area thus outlined was incised with a #15 scalpel blade. The skin margins were undermined minimally to an appropriate distance in all directions around the primary defect and laterally outward around the island pedicle utilizing iris scissors. There was minimal undermining beneath the pedicle flap. Double Island Pedicle Flap Text: The defect edges were debeveled with a #15 scalpel blade. Given the location of the defect, shape of the defect and the proximity to free margins a double island pedicle advancement flap was deemed most appropriate. Using a sterile surgical marker, an appropriate advancement flap was drawn incorporating the defect, outlining the appropriate donor tissue and placing the expected incisions within the relaxed skin tension lines where possible. The area thus outlined was incised deep to adipose tissue with a #15 scalpel blade. The skin margins were undermined to an appropriate distance in all directions around the primary defect and laterally outward around the island pedicle utilizing iris scissors. There was minimal undermining beneath the pedicle flap. Island Pedicle Flap-Requiring Vessel Identification Text: The defect edges were debeveled with a #15 scalpel blade. Given the location of the defect, shape of the defect and the proximity to free margins an island pedicle advancement flap was deemed most appropriate. Using a sterile surgical marker, an appropriate advancement flap was drawn, based on the axial vessel mentioned above, incorporating the defect, outlining the appropriate donor tissue and placing the expected incisions within the relaxed skin tension lines where possible. The area thus outlined was incised deep to adipose tissue with a #15 scalpel blade. The skin margins were undermined to an appropriate distance in all directions around the primary defect and laterally outward around the island pedicle utilizing iris scissors. There was minimal undermining beneath the pedicle flap. Keystone Flap Text: The defect edges were debeveled with a #15 scalpel blade. Given the location of the defect, shape of the defect a keystone flap was deemed most appropriate. Using a sterile surgical marker, an appropriate keystone flap was drawn incorporating the defect, outlining the appropriate donor tissue and placing the expected incisions within the relaxed skin tension lines where possible. The area thus outlined was incised deep to adipose tissue with a #15 scalpel blade. The skin margins were undermined to an appropriate distance in all directions around the primary defect and laterally outward around the flap utilizing iris scissors. O-T Plasty Text: The defect edges were debeveled with a #15 scalpel blade. Given the location of the defect, shape of the defect and the proximity to free margins an O-T plasty was deemed most appropriate. Using a sterile surgical marker, an appropriate O-T plasty was drawn incorporating the defect and placing the expected incisions within the relaxed skin tension lines where possible. The area thus outlined was incised deep to adipose tissue with a #15 scalpel blade. The skin margins were undermined to an appropriate distance in all directions utilizing iris scissors. O-Z Plasty Text: The defect edges were debeveled with a #15 scalpel blade. Given the location of the defect, shape of the defect and the proximity to free margins an O-Z plasty (double transposition flap) was deemed most appropriate. Using a sterile surgical marker, the appropriate transposition flaps were drawn incorporating the defect and placing the expected incisions within the relaxed skin tension lines where possible. The area thus outlined was incised deep to adipose tissue with a #15 scalpel blade. The skin margins were undermined to an appropriate distance in all directions utilizing iris scissors. Hemostasis was achieved with electrocautery. The flaps were then transposed into place, one clockwise and the other counterclockwise, and anchored with interrupted buried subcutaneous sutures. Double O-Z Plasty Text: The defect edges were debeveled with a #15 scalpel blade. Given the location of the defect, shape of the defect and the proximity to free margins a Double O-Z plasty (double transposition flap) was deemed most appropriate. Using a sterile surgical marker, the appropriate transposition flaps were drawn incorporating the defect and placing the expected incisions within the relaxed skin tension lines where possible. The area thus outlined was incised deep to adipose tissue with a #15 scalpel blade. The skin margins were undermined to an appropriate distance in all directions utilizing iris scissors. Hemostasis was achieved with electrocautery. The flaps were then transposed into place, one clockwise and the other counterclockwise, and anchored with interrupted buried subcutaneous sutures. V-Y Plasty Text: The defect edges were debeveled with a #15 scalpel blade. Given the location of the defect, shape of the defect and the proximity to free margins an V-Y advancement flap was deemed most appropriate. Using a sterile surgical marker, an appropriate advancement flap was drawn incorporating the defect and placing the expected incisions within the relaxed skin tension lines where possible. The area thus outlined was incised deep to adipose tissue with a #15 scalpel blade. The skin margins were undermined to an appropriate distance in all directions utilizing iris scissors. H Plasty Text: Given the location of the defect, shape of the defect and the proximity to free margins a H-plasty was deemed most appropriate for repair. Using a sterile surgical marker, the appropriate advancement arms of the H-plasty were drawn incorporating the defect and placing the expected incisions within the relaxed skin tension lines where possible. The area thus outlined was incised deep to adipose tissue with a #15 scalpel blade. The skin margins were undermined to an appropriate distance in all directions utilizing iris scissors. The opposing advancement arms were then advanced into place in opposite direction and anchored with interrupted buried subcutaneous sutures. W Plasty Text: The lesion was extirpated to the level of the fat with a #15 scalpel blade. Given the location of the defect, shape of the defect and the proximity to free margins a W-plasty was deemed most appropriate for repair. Using a sterile surgical marker, the appropriate transposition arms of the W-plasty were drawn incorporating the defect and placing the expected incisions within the relaxed skin tension lines where possible. The area thus outlined was incised deep to adipose tissue with a #15 scalpel blade. The skin margins were undermined to an appropriate distance in all directions utilizing iris scissors. The opposing transposition arms were then transposed into place in opposite direction and anchored with interrupted buried subcutaneous sutures. Z Plasty Text: The lesion was extirpated to the level of the fat with a #15 scalpel blade. Given the location of the defect, shape of the defect and the proximity to free margins a Z-plasty was deemed most appropriate for repair. Using a sterile surgical marker, the appropriate transposition arms of the Z-plasty were drawn incorporating the defect and placing the expected incisions within the relaxed skin tension lines where possible. The area thus outlined was incised deep to adipose tissue with a #15 scalpel blade. The skin margins were undermined to an appropriate distance in all directions utilizing iris scissors. The opposing transposition arms were then transposed into place in opposite direction and anchored with interrupted buried subcutaneous sutures. Zygomaticofacial Flap Text: Given the location of the defect, shape of the defect and the proximity to free margins a zygomaticofacial flap was deemed most appropriate for repair. Using a sterile surgical marker, the appropriate flap was drawn incorporating the defect and placing the expected incisions within the relaxed skin tension lines where possible. The area thus outlined was incised deep to adipose tissue with a #15 scalpel blade with preservation of a vascular pedicle. The skin margins were undermined to an appropriate distance in all directions utilizing iris scissors. The flap was then placed into the defect and anchored with interrupted buried subcutaneous sutures. Cheek Interpolation Flap Text: A decision was made to reconstruct the defect utilizing an interpolation axial flap and a staged reconstruction. A telfa template was made of the defect. This telfa template was then used to outline the Cheek Interpolation flap. The donor area for the pedicle flap was then injected with anesthesia. The flap was excised through the skin and subcutaneous tissue down to the layer of the underlying musculature. The interpolation flap was carefully excised within this deep plane to maintain its blood supply. The edges of the donor site were undermined. The donor site was closed in a primary fashion. The pedicle was then rotated into position and sutured. Once the tube was sutured into place, adequate blood supply was confirmed with blanching and refill. The pedicle was then wrapped with xeroform gauze and dressed appropriately with a telfa and gauze bandage to ensure continued blood supply and protect the attached pedicle. Cheek-To-Nose Interpolation Flap Text: A decision was made to reconstruct the defect utilizing an interpolation axial flap and a staged reconstruction. A telfa template was made of the defect. This telfa template was then used to outline the Cheek-To-Nose Interpolation flap. The donor area for the pedicle flap was then injected with anesthesia. The flap was excised through the skin and subcutaneous tissue down to the layer of the underlying musculature. The interpolation flap was carefully excised within this deep plane to maintain its blood supply. The edges of the donor site were undermined. The donor site was closed in a primary fashion. The pedicle was then rotated into position and sutured. Once the tube was sutured into place, adequate blood supply was confirmed with blanching and refill. The pedicle was then wrapped with xeroform gauze and dressed appropriately with a telfa and gauze bandage to ensure continued blood supply and protect the attached pedicle. Interpolation Flap Text: A decision was made to reconstruct the defect utilizing an interpolation axial flap and a staged reconstruction. A telfa template was made of the defect. This telfa template was then used to outline the interpolation flap. The donor area for the pedicle flap was then injected with anesthesia. The flap was excised through the skin and subcutaneous tissue down to the layer of the underlying musculature. The interpolation flap was carefully excised within this deep plane to maintain its blood supply. The edges of the donor site were undermined. The donor site was closed in a primary fashion. The pedicle was then rotated into position and sutured. Once the tube was sutured into place, adequate blood supply was confirmed with blanching and refill. The pedicle was then wrapped with xeroform gauze and dressed appropriately with a telfa and gauze bandage to ensure continued blood supply and protect the attached pedicle. Melolabial Interpolation Flap Text: A decision was made to reconstruct the defect utilizing an interpolation axial flap and a staged reconstruction. A telfa template was made of the defect. This telfa template was then used to outline the melolabial interpolation flap. The donor area for the pedicle flap was then injected with anesthesia. The flap was excised through the skin and subcutaneous tissue down to the layer of the underlying musculature. The pedicle flap was carefully excised within this deep plane to maintain its blood supply. The edges of the donor site were undermined. The donor site was closed in a primary fashion. The pedicle was then rotated into position and sutured. Once the tube was sutured into place, adequate blood supply was confirmed with blanching and refill. The pedicle was then wrapped with xeroform gauze and dressed appropriately with a telfa and gauze bandage to ensure continued blood supply and protect the attached pedicle. Mastoid Interpolation Flap Text: A decision was made to reconstruct the defect utilizing an interpolation axial flap and a staged reconstruction. A telfa template was made of the defect. This telfa template was then used to outline the mastoid interpolation flap. The donor area for the pedicle flap was then injected with anesthesia. The flap was excised through the skin and subcutaneous tissue down to the layer of the underlying musculature. The pedicle flap was carefully excised within this deep plane to maintain its blood supply. The edges of the donor site were undermined. The donor site was closed in a primary fashion. The pedicle was then rotated into position and sutured. Once the tube was sutured into place, adequate blood supply was confirmed with blanching and refill. The pedicle was then wrapped with xeroform gauze and dressed appropriately with a telfa and gauze bandage to ensure continued blood supply and protect the attached pedicle. Posterior Auricular Interpolation Flap Text: A decision was made to reconstruct the defect utilizing an interpolation axial flap and a staged reconstruction. A telfa template was made of the defect. This telfa template was then used to outline the posterior auricular interpolation flap. The donor area for the pedicle flap was then injected with anesthesia. The flap was excised through the skin and subcutaneous tissue down to the layer of the underlying musculature. The pedicle flap was carefully excised within this deep plane to maintain its blood supply. The edges of the donor site were undermined. The donor site was closed in a primary fashion. The pedicle was then rotated into position and sutured. Once the tube was sutured into place, adequate blood supply was confirmed with blanching and refill. The pedicle was then wrapped with xeroform gauze and dressed appropriately with a telfa and gauze bandage to ensure continued blood supply and protect the attached pedicle. Paramedian Forehead Flap Text: A decision was made to reconstruct the defect utilizing an interpolation axial flap and a staged reconstruction. A telfa template was made of the defect. This telfa template was then used to outline the paramedian forehead pedicle flap. The donor area for the pedicle flap was then injected with anesthesia. The flap was excised through the skin and subcutaneous tissue down to the layer of the underlying musculature. The pedicle flap was carefully excised within this deep plane to maintain its blood supply. The edges of the donor site were undermined. The donor site was closed in a primary fashion. The pedicle was then rotated into position and sutured. Once the tube was sutured into place, adequate blood supply was confirmed with blanching and refill. The pedicle was then wrapped with xeroform gauze and dressed appropriately with a telfa and gauze bandage to ensure continued blood supply and protect the attached pedicle. Lip Wedge Excision Repair Text: Given the location of the defect and the proximity to free margins a full thickness wedge repair was deemed most appropriate. Using a sterile surgical marker, the appropriate repair was drawn incorporating the defect and placing the expected incisions perpendicular to the vermilion border. The vermilion border was also meticulously outlined to ensure appropriate reapproximation during the repair. The area thus outlined was incised through and through with a #15 scalpel blade. The muscularis and dermis were reaproximated with deep sutures following hemostasis. Care was taken to realign the vermilion border before proceeding with the superficial closure. Once the vermilion was realigned the superfical and mucosal closure was finished. Ftsg Text: The defect edges were debeveled with a #15 scalpel blade. Given the location of the defect, shape of the defect and the proximity to free margins a full thickness skin graft was deemed most appropriate. Using a sterile surgical marker, the primary defect shape was transferred to the donor site. The area thus outlined was incised deep to adipose tissue with a #15 scalpel blade. The harvested graft was then trimmed of adipose tissue until only dermis and epidermis was left. The skin margins of the secondary defect were undermined to an appropriate distance in all directions utilizing iris scissors. The secondary defect was closed with interrupted buried subcutaneous sutures. The skin edges were then re-apposed with running  sutures. The skin graft was then placed in the primary defect and oriented appropriately. Split-Thickness Skin Graft Text: The defect edges were debeveled with a #15 scalpel blade. Given the location of the defect, shape of the defect and the proximity to free margins a split thickness skin graft was deemed most appropriate. Using a sterile surgical marker, the primary defect shape was transferred to the donor site. The split thickness graft was then harvested. The skin graft was then placed in the primary defect and oriented appropriately. Burow's Graft Text: The defect edges were debeveled with a #15 scalpel blade. Given the location of the defect, shape of the defect, the proximity to free margins and the presence of a standing cone deformity a Burow's skin graft was deemed most appropriate. The standing cone was removed and this tissue was then trimmed to the shape of the primary defect. The adipose tissue was also removed until only dermis and epidermis were left. The skin margins of the secondary defect were undermined to an appropriate distance in all directions utilizing iris scissors. The secondary defect was closed with interrupted buried subcutaneous sutures. The skin edges were then re-apposed with running  sutures. The skin graft was then placed in the primary defect and oriented appropriately. Cartilage Graft Text: The defect edges were debeveled with a #15 scalpel blade. Given the location of the defect, shape of the defect, the fact the defect involved a full thickness cartilage defect a cartilage graft was deemed most appropriate. An appropriate donor site was identified, cleansed, and anesthetized. The cartilage graft was then harvested and transferred to the recipient site, oriented appropriately and then sutured into place. The secondary defect was then repaired using a primary closure. Composite Graft Text: The defect edges were debeveled with a #15 scalpel blade. Given the location of the defect, shape of the defect, the proximity to free margins and the fact the defect was full thickness a composite graft was deemed most appropriate. The defect was outline and then transferred to the donor site. A full thickness graft was then excised from the donor site. The graft was then placed in the primary defect, oriented appropriately and then sutured into place. The secondary defect was then repaired using a primary closure. Epidermal Autograft Text: The defect edges were debeveled with a #15 scalpel blade. Given the location of the defect, shape of the defect and the proximity to free margins an epidermal autograft was deemed most appropriate. Using a sterile surgical marker, the primary defect shape was transferred to the donor site. The epidermal graft was then harvested. The skin graft was then placed in the primary defect and oriented appropriately. Dermal Autograft Text: The defect edges were debeveled with a #15 scalpel blade. Given the location of the defect, shape of the defect and the proximity to free margins a dermal autograft was deemed most appropriate. Using a sterile surgical marker, the primary defect shape was transferred to the donor site. The area thus outlined was incised deep to adipose tissue with a #15 scalpel blade. The harvested graft was then trimmed of adipose and epidermal tissue until only dermis was left. The skin graft was then placed in the primary defect and oriented appropriately. Skin Substitute Text: The defect edges were debeveled with a #15 scalpel blade. Given the location of the defect, shape of the defect and the proximity to free margins a skin substitute graft was deemed most appropriate. The graft material was trimmed to fit the size of the defect. The graft was then placed in the primary defect and oriented appropriately. Tissue Cultured Epidermal Autograft Text: The defect edges were debeveled with a #15 scalpel blade. Given the location of the defect, shape of the defect and the proximity to free margins a tissue cultured epidermal autograft was deemed most appropriate. The graft was then trimmed to fit the size of the defect. The graft was then placed in the primary defect and oriented appropriately. Xenograft Text: The defect edges were debeveled with a #15 scalpel blade. Given the location of the defect, shape of the defect and the proximity to free margins a xenograft was deemed most appropriate. The graft was then trimmed to fit the size of the defect. The graft was then placed in the primary defect and oriented appropriately. Purse String (Intermediate) Text: Given the location of the defect and the characteristics of the surrounding skin a purse string intermediate closure was deemed most appropriate. Undermining was performed circumferentially around the surgical defect. A purse string suture was then placed and tightened. Purse String (Simple) Text: Given the location of the defect and the characteristics of the surrounding skin a purse string simple closure was deemed most appropriate. Undermining was performed circumferentially around the surgical defect. A purse string suture was then placed and tightened. Complex Repair And Single Advancement Flap Text: The defect edges were debeveled with a #15 scalpel blade. The primary defect was closed partially with a complex linear closure. Given the location of the remaining defect, shape of the defect and the proximity to free margins a single advancement flap was deemed most appropriate for complete closure of the defect. Using a sterile surgical marker, an appropriate advancement flap was drawn incorporating the defect and placing the expected incisions within the relaxed skin tension lines where possible. The area thus outlined was incised deep to adipose tissue with a #15 scalpel blade. The skin margins were undermined to an appropriate distance in all directions utilizing iris scissors. Complex Repair And Double Advancement Flap Text: The defect edges were debeveled with a #15 scalpel blade. The primary defect was closed partially with a complex linear closure. Given the location of the remaining defect, shape of the defect and the proximity to free margins a double advancement flap was deemed most appropriate for complete closure of the defect. Using a sterile surgical marker, an appropriate advancement flap was drawn incorporating the defect and placing the expected incisions within the relaxed skin tension lines where possible. The area thus outlined was incised deep to adipose tissue with a #15 scalpel blade. The skin margins were undermined to an appropriate distance in all directions utilizing iris scissors. Complex Repair And Modified Advancement Flap Text: The defect edges were debeveled with a #15 scalpel blade. The primary defect was closed partially with a complex linear closure. Given the location of the remaining defect, shape of the defect and the proximity to free margins a modified advancement flap was deemed most appropriate for complete closure of the defect. Using a sterile surgical marker, an appropriate advancement flap was drawn incorporating the defect and placing the expected incisions within the relaxed skin tension lines where possible. The area thus outlined was incised deep to adipose tissue with a #15 scalpel blade. The skin margins were undermined to an appropriate distance in all directions utilizing iris scissors. Complex Repair And A-T Advancement Flap Text: The defect edges were debeveled with a #15 scalpel blade. The primary defect was closed partially with a complex linear closure. Given the location of the remaining defect, shape of the defect and the proximity to free margins an A-T advancement flap was deemed most appropriate for complete closure of the defect. Using a sterile surgical marker, an appropriate advancement flap was drawn incorporating the defect and placing the expected incisions within the relaxed skin tension lines where possible. The area thus outlined was incised deep to adipose tissue with a #15 scalpel blade. The skin margins were undermined to an appropriate distance in all directions utilizing iris scissors. Complex Repair And O-T Advancement Flap Text: The defect edges were debeveled with a #15 scalpel blade. The primary defect was closed partially with a complex linear closure. Given the location of the remaining defect, shape of the defect and the proximity to free margins an O-T advancement flap was deemed most appropriate for complete closure of the defect. Using a sterile surgical marker, an appropriate advancement flap was drawn incorporating the defect and placing the expected incisions within the relaxed skin tension lines where possible. The area thus outlined was incised deep to adipose tissue with a #15 scalpel blade. The skin margins were undermined to an appropriate distance in all directions utilizing iris scissors. Complex Repair And O-L Flap Text: The defect edges were debeveled with a #15 scalpel blade. The primary defect was closed partially with a complex linear closure. Given the location of the remaining defect, shape of the defect and the proximity to free margins an O-L flap was deemed most appropriate for complete closure of the defect. Using a sterile surgical marker, an appropriate flap was drawn incorporating the defect and placing the expected incisions within the relaxed skin tension lines where possible. The area thus outlined was incised deep to adipose tissue with a #15 scalpel blade. The skin margins were undermined to an appropriate distance in all directions utilizing iris scissors. Complex Repair And Bilobe Flap Text: The defect edges were debeveled with a #15 scalpel blade. The primary defect was closed partially with a complex linear closure. Given the location of the remaining defect, shape of the defect and the proximity to free margins a bilobe flap was deemed most appropriate for complete closure of the defect. Using a sterile surgical marker, an appropriate advancement flap was drawn incorporating the defect and placing the expected incisions within the relaxed skin tension lines where possible. The area thus outlined was incised deep to adipose tissue with a #15 scalpel blade. The skin margins were undermined to an appropriate distance in all directions utilizing iris scissors. Complex Repair And Melolabial Flap Text: The defect edges were debeveled with a #15 scalpel blade. The primary defect was closed partially with a complex linear closure. Given the location of the remaining defect, shape of the defect and the proximity to free margins a melolabial flap was deemed most appropriate for complete closure of the defect. Using a sterile surgical marker, an appropriate advancement flap was drawn incorporating the defect and placing the expected incisions within the relaxed skin tension lines where possible. The area thus outlined was incised deep to adipose tissue with a #15 scalpel blade. The skin margins were undermined to an appropriate distance in all directions utilizing iris scissors. Complex Repair And Rotation Flap Text: The defect edges were debeveled with a #15 scalpel blade. The primary defect was closed partially with a complex linear closure. Given the location of the remaining defect, shape of the defect and the proximity to free margins a rotation flap was deemed most appropriate for complete closure of the defect. Using a sterile surgical marker, an appropriate advancement flap was drawn incorporating the defect and placing the expected incisions within the relaxed skin tension lines where possible. The area thus outlined was incised deep to adipose tissue with a #15 scalpel blade. The skin margins were undermined to an appropriate distance in all directions utilizing iris scissors. Complex Repair And Rhombic Flap Text: The defect edges were debeveled with a #15 scalpel blade. The primary defect was closed partially with a complex linear closure. Given the location of the remaining defect, shape of the defect and the proximity to free margins a rhombic flap was deemed most appropriate for complete closure of the defect. Using a sterile surgical marker, an appropriate advancement flap was drawn incorporating the defect and placing the expected incisions within the relaxed skin tension lines where possible. The area thus outlined was incised deep to adipose tissue with a #15 scalpel blade. The skin margins were undermined to an appropriate distance in all directions utilizing iris scissors. Complex Repair And Transposition Flap Text: The defect edges were debeveled with a #15 scalpel blade. The primary defect was closed partially with a complex linear closure. Given the location of the remaining defect, shape of the defect and the proximity to free margins a transposition flap was deemed most appropriate for complete closure of the defect. Using a sterile surgical marker, an appropriate advancement flap was drawn incorporating the defect and placing the expected incisions within the relaxed skin tension lines where possible. The area thus outlined was incised deep to adipose tissue with a #15 scalpel blade. The skin margins were undermined to an appropriate distance in all directions utilizing iris scissors. Complex Repair And V-Y Plasty Text: The defect edges were debeveled with a #15 scalpel blade. The primary defect was closed partially with a complex linear closure. Given the location of the remaining defect, shape of the defect and the proximity to free margins a V-Y plasty was deemed most appropriate for complete closure of the defect. Using a sterile surgical marker, an appropriate advancement flap was drawn incorporating the defect and placing the expected incisions within the relaxed skin tension lines where possible. The area thus outlined was incised deep to adipose tissue with a #15 scalpel blade. The skin margins were undermined to an appropriate distance in all directions utilizing iris scissors. Complex Repair And M Plasty Text: The defect edges were debeveled with a #15 scalpel blade. The primary defect was closed partially with a complex linear closure. Given the location of the remaining defect, shape of the defect and the proximity to free margins an M plasty was deemed most appropriate for complete closure of the defect. Using a sterile surgical marker, an appropriate advancement flap was drawn incorporating the defect and placing the expected incisions within the relaxed skin tension lines where possible. The area thus outlined was incised deep to adipose tissue with a #15 scalpel blade. The skin margins were undermined to an appropriate distance in all directions utilizing iris scissors. Complex Repair And Double M Plasty Text: The defect edges were debeveled with a #15 scalpel blade. The primary defect was closed partially with a complex linear closure. Given the location of the remaining defect, shape of the defect and the proximity to free margins a double M plasty was deemed most appropriate for complete closure of the defect. Using a sterile surgical marker, an appropriate advancement flap was drawn incorporating the defect and placing the expected incisions within the relaxed skin tension lines where possible. The area thus outlined was incised deep to adipose tissue with a #15 scalpel blade. The skin margins were undermined to an appropriate distance in all directions utilizing iris scissors. Complex Repair And W Plasty Text: The defect edges were debeveled with a #15 scalpel blade. The primary defect was closed partially with a complex linear closure. Given the location of the remaining defect, shape of the defect and the proximity to free margins a W plasty was deemed most appropriate for complete closure of the defect. Using a sterile surgical marker, an appropriate advancement flap was drawn incorporating the defect and placing the expected incisions within the relaxed skin tension lines where possible. The area thus outlined was incised deep to adipose tissue with a #15 scalpel blade. The skin margins were undermined to an appropriate distance in all directions utilizing iris scissors. Complex Repair And Z Plasty Text: The defect edges were debeveled with a #15 scalpel blade. The primary defect was closed partially with a complex linear closure. Given the location of the remaining defect, shape of the defect and the proximity to free margins a Z plasty was deemed most appropriate for complete closure of the defect. Using a sterile surgical marker, an appropriate advancement flap was drawn incorporating the defect and placing the expected incisions within the relaxed skin tension lines where possible. The area thus outlined was incised deep to adipose tissue with a #15 scalpel blade. The skin margins were undermined to an appropriate distance in all directions utilizing iris scissors. Complex Repair And Dorsal Nasal Flap Text: The defect edges were debeveled with a #15 scalpel blade. The primary defect was closed partially with a complex linear closure. Given the location of the remaining defect, shape of the defect and the proximity to free margins a dorsal nasal flap was deemed most appropriate for complete closure of the defect. Using a sterile surgical marker, an appropriate flap was drawn incorporating the defect and placing the expected incisions within the relaxed skin tension lines where possible. The area thus outlined was incised deep to adipose tissue with a #15 scalpel blade. The skin margins were undermined to an appropriate distance in all directions utilizing iris scissors. Complex Repair And Ftsg Text: The defect edges were debeveled with a #15 scalpel blade. The primary defect was closed partially with a complex linear closure. Given the location of the defect, shape of the defect and the proximity to free margins a full thickness skin graft was deemed most appropriate to repair the remaining defect. The graft was trimmed to fit the size of the remaining defect. The graft was then placed in the primary defect, oriented appropriately, and sutured into place. Complex Repair And Burow's Graft Text: The defect edges were debeveled with a #15 scalpel blade. The primary defect was closed partially with a complex linear closure. Given the location of the defect, shape of the defect, the proximity to free margins and the presence of a standing cone deformity a Burow's graft was deemed most appropriate to repair the remaining defect. The graft was trimmed to fit the size of the remaining defect. The graft was then placed in the primary defect, oriented appropriately, and sutured into place. Complex Repair And Split-Thickness Skin Graft Text: The defect edges were debeveled with a #15 scalpel blade. The primary defect was closed partially with a complex linear closure. Given the location of the defect, shape of the defect and the proximity to free margins a split thickness skin graft was deemed most appropriate to repair the remaining defect. The graft was trimmed to fit the size of the remaining defect. The graft was then placed in the primary defect, oriented appropriately, and sutured into place. Complex Repair And Epidermal Autograft Text: The defect edges were debeveled with a #15 scalpel blade. The primary defect was closed partially with a complex linear closure. Given the location of the defect, shape of the defect and the proximity to free margins an epidermal autograft was deemed most appropriate to repair the remaining defect. The graft was trimmed to fit the size of the remaining defect. The graft was then placed in the primary defect, oriented appropriately, and sutured into place. Complex Repair And Dermal Autograft Text: The defect edges were debeveled with a #15 scalpel blade. The primary defect was closed partially with a complex linear closure. Given the location of the defect, shape of the defect and the proximity to free margins an dermal autograft was deemed most appropriate to repair the remaining defect. The graft was trimmed to fit the size of the remaining defect. The graft was then placed in the primary defect, oriented appropriately, and sutured into place. Complex Repair And Tissue Cultured Epidermal Autograft Text: The defect edges were debeveled with a #15 scalpel blade. The primary defect was closed partially with a complex linear closure. Given the location of the defect, shape of the defect and the proximity to free margins an tissue cultured epidermal autograft was deemed most appropriate to repair the remaining defect. The graft was trimmed to fit the size of the remaining defect. The graft was then placed in the primary defect, oriented appropriately, and sutured into place. Complex Repair And Xenograft Text: The defect edges were debeveled with a #15 scalpel blade. The primary defect was closed partially with a complex linear closure. Given the location of the defect, shape of the defect and the proximity to free margins a xenograft was deemed most appropriate to repair the remaining defect. The graft was trimmed to fit the size of the remaining defect. The graft was then placed in the primary defect, oriented appropriately, and sutured into place. Complex Repair And Skin Substitute Graft Text: The defect edges were debeveled with a #15 scalpel blade. The primary defect was closed partially with a complex linear closure. Given the location of the remaining defect, shape of the defect and the proximity to free margins a skin substitute graft was deemed most appropriate to repair the remaining defect. The graft was trimmed to fit the size of the remaining defect. The graft was then placed in the primary defect, oriented appropriately, and sutured into place. Path Notes (To The Dermatopathologist): Size: 0.8 x 0.8\\nClosure: 2. 5\\nR/o: NUB vs EIC Consent was obtained from the patient. The risks and benefits to therapy were discussed in detail. Specifically, the risks of infection, scarring, bleeding, prolonged wound healing, incomplete removal, allergy to anesthesia, nerve injury and recurrence were addressed. Prior to the procedure, the treatment site was clearly identified and confirmed by the patient. All components of Universal Protocol/PAUSE Rule completed. Post-Care Instructions: I reviewed with the patient in detail post-care instructions. Patient is not to engage in any heavy lifting, exercise, or swimming for the next 14 days. Should the patient develop any fevers, chills, bleeding, severe pain patient will contact the office immediately. Home Suture Removal Text: Patient was provided a home suture removal kit and will remove their sutures at home. If they have any questions or difficulties they will call the office. Where Do You Want The Question To Include Opioid Counseling Located?: Case Summary Tab Billing Type: [employer] Information: Selecting Yes will display possible errors in your note based on the variables you have selected. This validation is only offered as a suggestion for you. PLEASE NOTE THAT THE VALIDATION TEXT WILL BE REMOVED WHEN YOU FINALIZE YOUR NOTE. IF YOU WANT TO FAX A PRELIMINARY NOTE YOU WILL NEED TO TOGGLE THIS TO 'NO' IF YOU DO NOT WANT IT IN YOUR FAXED NOTE.

## 2021-04-23 ENCOUNTER — OFFICE VISIT (OUTPATIENT)
Dept: INTERNAL MEDICINE CLINIC | Facility: CLINIC | Age: 86
End: 2021-04-23
Payer: MEDICARE

## 2021-04-23 VITALS
BODY MASS INDEX: 28 KG/M2 | DIASTOLIC BLOOD PRESSURE: 90 MMHG | OXYGEN SATURATION: 99 % | TEMPERATURE: 98 F | WEIGHT: 206.88 LBS | RESPIRATION RATE: 18 BRPM | SYSTOLIC BLOOD PRESSURE: 150 MMHG | HEART RATE: 60 BPM

## 2021-04-23 DIAGNOSIS — I10 ELEVATED BLOOD PRESSURE READING IN OFFICE WITH DIAGNOSIS OF HYPERTENSION: Primary | ICD-10-CM

## 2021-04-23 DIAGNOSIS — E66.3 OVERWEIGHT (BMI 25.0-29.9): ICD-10-CM

## 2021-04-23 PROCEDURE — 99213 OFFICE O/P EST LOW 20 MIN: CPT | Performed by: INTERNAL MEDICINE

## 2021-04-23 RX ORDER — TRIFLURIDINE 10 MG/ML
SOLUTION OPHTHALMIC
COMMUNITY
Start: 2020-12-01 | End: 2021-10-25

## 2021-04-23 RX ORDER — TIMOLOL MALEATE 5 MG/ML
1 SOLUTION/ DROPS OPHTHALMIC 2 TIMES DAILY
COMMUNITY
Start: 2020-12-08 | End: 2021-10-25

## 2021-04-23 RX ORDER — TOBRAMYCIN AND DEXAMETHASONE 3; 1 MG/ML; MG/ML
SUSPENSION/ DROPS OPHTHALMIC
COMMUNITY
Start: 2020-11-11

## 2021-04-23 NOTE — PROGRESS NOTES
Because complaint of elevated blood pressure he is asymptomatic. No headaches dizziness chest pain or shortness of breath. He has been evaluated by his [place] doctor and found to have high blood pressure.   His home blood pressure readings are 90% above nor

## 2021-05-25 ENCOUNTER — OFFICE VISIT (OUTPATIENT)
Dept: INTERNAL MEDICINE CLINIC | Facility: CLINIC | Age: 86
End: 2021-05-25
Payer: MEDICARE

## 2021-05-25 VITALS
HEART RATE: 51 BPM | SYSTOLIC BLOOD PRESSURE: 135 MMHG | DIASTOLIC BLOOD PRESSURE: 70 MMHG | WEIGHT: 208 LBS | BODY MASS INDEX: 28 KG/M2 | OXYGEN SATURATION: 99 % | RESPIRATION RATE: 19 BRPM

## 2021-05-25 DIAGNOSIS — I10 ELEVATED BLOOD PRESSURE READING IN OFFICE WITH DIAGNOSIS OF HYPERTENSION: Primary | ICD-10-CM

## 2021-05-25 PROCEDURE — 99213 OFFICE O/P EST LOW 20 MIN: CPT | Performed by: INTERNAL MEDICINE

## 2021-05-25 NOTE — PROGRESS NOTES
Problem 1 hypertension. Win Freire denies headaches dizziness chest pain or shortness of breath. Home blood pressure readings generally slightly elevated systolics in the 1 1836 range. Repeat blood pressure here within normal range.     Physical examination p

## 2021-09-02 ENCOUNTER — OFFICE VISIT (OUTPATIENT)
Dept: INTERNAL MEDICINE CLINIC | Facility: CLINIC | Age: 86
End: 2021-09-02
Payer: MEDICARE

## 2021-09-02 VITALS
OXYGEN SATURATION: 99 % | TEMPERATURE: 99 F | RESPIRATION RATE: 19 BRPM | BODY MASS INDEX: 28 KG/M2 | HEART RATE: 63 BPM | DIASTOLIC BLOOD PRESSURE: 65 MMHG | SYSTOLIC BLOOD PRESSURE: 145 MMHG | WEIGHT: 208 LBS

## 2021-09-02 DIAGNOSIS — J22 LOWER RESPIRATORY TRACT INFECTION: ICD-10-CM

## 2021-09-02 DIAGNOSIS — I10 ELEVATED BLOOD PRESSURE READING IN OFFICE WITH DIAGNOSIS OF HYPERTENSION: Primary | ICD-10-CM

## 2021-09-02 PROCEDURE — 99214 OFFICE O/P EST MOD 30 MIN: CPT | Performed by: INTERNAL MEDICINE

## 2021-09-02 RX ORDER — AZITHROMYCIN 250 MG/1
TABLET, FILM COATED ORAL
Qty: 6 TABLET | Refills: 0 | Status: SHIPPED | OUTPATIENT
Start: 2021-09-02 | End: 2021-09-07

## 2021-09-02 RX ORDER — CODEINE PHOSPHATE AND GUAIFENESIN 10; 100 MG/5ML; MG/5ML
5 SOLUTION ORAL 4 TIMES DAILY PRN
Qty: 473 ML | Refills: 0 | Status: SHIPPED | OUTPATIENT
Start: 2021-09-02 | End: 2021-10-25

## 2021-09-02 NOTE — PROGRESS NOTES
For the last several days patient states he has had a chest cold. He does feel somewhat feverish or chills. There is no loss of sense of taste or smell. He states recently he has been at a wedding and his daughter is sick to.   He will be Covid tested to

## 2021-09-07 ENCOUNTER — TELEPHONE (OUTPATIENT)
Dept: INTERNAL MEDICINE CLINIC | Facility: CLINIC | Age: 86
End: 2021-09-07

## 2021-09-07 NOTE — TELEPHONE ENCOUNTER
Spoke with Kelvin Rendon. Dinh Tao He was diagnosed with COVID-19. Feeling back to normal except fatigue.   10-day isolation will be up this coming Friday

## 2021-09-08 ENCOUNTER — TELEPHONE (OUTPATIENT)
Dept: INTERNAL MEDICINE CLINIC | Facility: CLINIC | Age: 86
End: 2021-09-08

## 2021-09-08 NOTE — TELEPHONE ENCOUNTER
Patient had a positive COVID test on 09/03/2021. Patient states that he is wheezing a little still with fatigue. Congestion is clearing up. Patient denies any fever and body aches.  Will call tomorrow to follow up

## 2021-09-09 ENCOUNTER — TELEPHONE (OUTPATIENT)
Dept: INTERNAL MEDICINE CLINIC | Facility: CLINIC | Age: 86
End: 2021-09-09

## 2021-09-09 NOTE — TELEPHONE ENCOUNTER
Called patient to follow up on his condition and patient stated that he has no new symptoms and his wheezing is getting better. Patient will see MD on 09/16/2021.  Patient was advised to pull his emergency chord over the weekend if he has any symptoms that

## 2021-09-13 RX ORDER — FUROSEMIDE 20 MG/1
20 TABLET ORAL 2 TIMES DAILY
Qty: 180 TABLET | Refills: 3 | Status: SHIPPED | OUTPATIENT
Start: 2021-09-13

## 2021-09-13 NOTE — TELEPHONE ENCOUNTER
Patient gets his medications through the [place]. He needs a new script sent to them for furosemide 20 mg 1 tablet in the AM and 1 tablet in the PM. This needs to be faxed to the [place] at [phone]. They also need a copy of his last lab work.

## 2021-09-16 ENCOUNTER — OFFICE VISIT (OUTPATIENT)
Dept: INTERNAL MEDICINE CLINIC | Facility: CLINIC | Age: 86
End: 2021-09-16
Payer: MEDICARE

## 2021-09-16 VITALS
SYSTOLIC BLOOD PRESSURE: 140 MMHG | RESPIRATION RATE: 18 BRPM | BODY MASS INDEX: 28 KG/M2 | WEIGHT: 205 LBS | HEART RATE: 53 BPM | OXYGEN SATURATION: 99 % | DIASTOLIC BLOOD PRESSURE: 70 MMHG

## 2021-09-16 DIAGNOSIS — Z86.16 HISTORY OF COVID-19: Primary | ICD-10-CM

## 2021-09-16 PROCEDURE — 99214 OFFICE O/P EST MOD 30 MIN: CPT | Performed by: INTERNAL MEDICINE

## 2021-09-16 NOTE — PROGRESS NOTES
Shima Blackmon contacted cold foot most likely at a wedding ceremony August 28. At that time his daughter and cousin also contacted Covid. He did test positive.   He states he is feeling better but still kind of tired fatigue taste of has improved smell however is

## 2021-09-28 ENCOUNTER — TELEPHONE (OUTPATIENT)
Dept: INTERNAL MEDICINE CLINIC | Facility: CLINIC | Age: 86
End: 2021-09-28

## 2021-09-28 NOTE — TELEPHONE ENCOUNTER
In discussion with patient, I believe he is asking for a diagnosis code for his insurance company.   I gave him E11.9 for his diabetes

## 2021-10-20 PROBLEM — R73.9 HYPERGLYCEMIA: Status: RESOLVED | Noted: 2019-10-18 | Resolved: 2021-10-20

## 2021-10-20 PROBLEM — N20.0 KIDNEY STONE: Status: RESOLVED | Noted: 2019-02-20 | Resolved: 2021-10-20

## 2021-10-21 ENCOUNTER — OFFICE VISIT (OUTPATIENT)
Dept: INTERNAL MEDICINE CLINIC | Facility: CLINIC | Age: 86
End: 2021-10-21
Payer: MEDICARE

## 2021-10-21 VITALS
HEART RATE: 56 BPM | WEIGHT: 208 LBS | DIASTOLIC BLOOD PRESSURE: 70 MMHG | RESPIRATION RATE: 18 BRPM | BODY MASS INDEX: 28 KG/M2 | SYSTOLIC BLOOD PRESSURE: 140 MMHG | OXYGEN SATURATION: 98 %

## 2021-10-21 DIAGNOSIS — D47.2 MONOCLONAL GAMMOPATHIES, BENIGN: ICD-10-CM

## 2021-10-21 DIAGNOSIS — R35.1 NOCTURIA: ICD-10-CM

## 2021-10-21 DIAGNOSIS — S72.041D CLOSED DISPLACED BASICERVICAL FRACTURE OF RIGHT FEMUR WITH ROUTINE HEALING: ICD-10-CM

## 2021-10-21 DIAGNOSIS — Z12.5 SCREENING FOR PROSTATE CANCER: ICD-10-CM

## 2021-10-21 DIAGNOSIS — E78.5 DM TYPE 2 WITH DIABETIC DYSLIPIDEMIA (HCC): ICD-10-CM

## 2021-10-21 DIAGNOSIS — E83.42 HYPOMAGNESEMIA: ICD-10-CM

## 2021-10-21 DIAGNOSIS — H54.40 BLINDNESS OF RIGHT EYE WITH NORMAL VISION IN CONTRALATERAL EYE: ICD-10-CM

## 2021-10-21 DIAGNOSIS — N20.0 KIDNEY STONE: ICD-10-CM

## 2021-10-21 DIAGNOSIS — N18.32 TYPE 2 DIABETES MELLITUS WITH STAGE 3B CHRONIC KIDNEY DISEASE, WITHOUT LONG-TERM CURRENT USE OF INSULIN (HCC): ICD-10-CM

## 2021-10-21 DIAGNOSIS — I10 ESSENTIAL HYPERTENSION: ICD-10-CM

## 2021-10-21 DIAGNOSIS — Z90.5 STATUS POST NEPHRECTOMY: Primary | ICD-10-CM

## 2021-10-21 DIAGNOSIS — B02.30 HERPES ZOSTER OPHTHALMICUS OF LEFT EYE: ICD-10-CM

## 2021-10-21 DIAGNOSIS — Z00.00 ENCOUNTER FOR ANNUAL HEALTH EXAMINATION: ICD-10-CM

## 2021-10-21 DIAGNOSIS — R19.7 DIARRHEA, UNSPECIFIED TYPE: ICD-10-CM

## 2021-10-21 DIAGNOSIS — E11.69 DM TYPE 2 WITH DIABETIC DYSLIPIDEMIA (HCC): ICD-10-CM

## 2021-10-21 DIAGNOSIS — N40.1 BENIGN PROSTATIC HYPERPLASIA WITH LOWER URINARY TRACT SYMPTOMS, SYMPTOM DETAILS UNSPECIFIED: ICD-10-CM

## 2021-10-21 DIAGNOSIS — E11.22 TYPE 2 DIABETES MELLITUS WITH STAGE 3B CHRONIC KIDNEY DISEASE, WITHOUT LONG-TERM CURRENT USE OF INSULIN (HCC): ICD-10-CM

## 2021-10-21 PROCEDURE — 82784 ASSAY IGA/IGD/IGG/IGM EACH: CPT | Performed by: INTERNAL MEDICINE

## 2021-10-21 PROCEDURE — 86334 IMMUNOFIX E-PHORESIS SERUM: CPT | Performed by: INTERNAL MEDICINE

## 2021-10-21 PROCEDURE — 82043 UR ALBUMIN QUANTITATIVE: CPT | Performed by: INTERNAL MEDICINE

## 2021-10-21 PROCEDURE — 80053 COMPREHEN METABOLIC PANEL: CPT | Performed by: INTERNAL MEDICINE

## 2021-10-21 PROCEDURE — 83735 ASSAY OF MAGNESIUM: CPT | Performed by: INTERNAL MEDICINE

## 2021-10-21 PROCEDURE — 85025 COMPLETE CBC W/AUTO DIFF WBC: CPT | Performed by: INTERNAL MEDICINE

## 2021-10-21 PROCEDURE — G0439 PPPS, SUBSEQ VISIT: HCPCS | Performed by: INTERNAL MEDICINE

## 2021-10-21 PROCEDURE — 82570 ASSAY OF URINE CREATININE: CPT | Performed by: INTERNAL MEDICINE

## 2021-10-21 PROCEDURE — 83036 HEMOGLOBIN GLYCOSYLATED A1C: CPT | Performed by: INTERNAL MEDICINE

## 2021-10-21 PROCEDURE — 93000 ELECTROCARDIOGRAM COMPLETE: CPT | Performed by: INTERNAL MEDICINE

## 2021-10-21 PROCEDURE — 83883 ASSAY NEPHELOMETRY NOT SPEC: CPT | Performed by: INTERNAL MEDICINE

## 2021-10-21 PROCEDURE — 84165 PROTEIN E-PHORESIS SERUM: CPT | Performed by: INTERNAL MEDICINE

## 2021-10-21 NOTE — PROGRESS NOTES
HPI:   Yuniel Yang is a 80year old male who presents for a Medicare Subsequent Annual Wellness visit (Pt already had Initial Annual Wellness).     C/O diarrhea   His last annual assessment has been over 1 year: Annual Physical due on 08/25/2021         F 60.8      Smokeless tobacco: Never Used      Tobacco comment: QUIT 48 YRS. AGO         CAGE screening score of 0 on 10/21/2021, showing low risk of alcohol abuse.          Patient Care Team: Patient Care Team:  Raj Garcia MD as PCP - General (Inte days  tobramycin-dexamethasone 0.3-0.1 % Ophthalmic Suspension, INSTILL 1 DROP IN RIGHT EYE QID FOR 7-10 DAYS  trifluridine 1 % Ophthalmic Solution, INT 1 GTT IN OS FID FOR 7 DAYS  Timolol Maleate 0.5 % Ophthalmic Solution, Place 1 drop into the left eye 2 tobacco. He reports current alcohol use of about 3.0 standard drinks of alcohol per week. He reports that he does not use drugs.      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    Please check with your insurance carrier before scheduling this patient.    Immunizations    Influenza Covered once per flu season  Please get every year -  Influenza Vaccine(1) due on 10/01/2021    Pneumococcal Each vaccine (Wffoskg04 & Svtgytvwk90) covered once after 65 Prevnar 13: 12/23/2014    Egtzyvvjv20: 02/0

## 2021-10-21 NOTE — PATIENT INSTRUCTIONS
Naveen Stanley's SCREENING SCHEDULE   Tests on this list are recommended by your physician but may not be covered, or covered at this frequency, by your insurer. Please check with your insurance carrier before scheduling to verify coverage.    PREVENTATI Influenza Covered once per flu season  Please get every year -  Influenza Vaccine(1) due on 10/01/2021    Pneumococcal Each vaccine (Nihywdx80 & Oemqcpntn56) covered once after 65 Prevnar 13: 12/23/2014    Jioovxziz32: 02/06/2009     No recommendations at website. http://www. [place]ph.state. [place]/public/books/advin.htm  A link to the [employer]. This site has a lot of good information including definitions of the different types of Advance Directives.  It also has the [facility] forms available

## 2021-10-25 ENCOUNTER — OFFICE VISIT (OUTPATIENT)
Dept: INTERNAL MEDICINE CLINIC | Facility: CLINIC | Age: 86
End: 2021-10-25
Payer: MEDICARE

## 2021-10-25 VITALS
SYSTOLIC BLOOD PRESSURE: 140 MMHG | BODY MASS INDEX: 28 KG/M2 | HEART RATE: 62 BPM | WEIGHT: 208 LBS | OXYGEN SATURATION: 98 % | RESPIRATION RATE: 18 BRPM | DIASTOLIC BLOOD PRESSURE: 80 MMHG

## 2021-10-25 DIAGNOSIS — R19.7 DIARRHEA, UNSPECIFIED TYPE: ICD-10-CM

## 2021-10-25 PROCEDURE — 87046 STOOL CULTR AEROBIC BACT EA: CPT | Performed by: INTERNAL MEDICINE

## 2021-10-25 PROCEDURE — 87045 FECES CULTURE AEROBIC BACT: CPT | Performed by: INTERNAL MEDICINE

## 2021-10-25 PROCEDURE — 99214 OFFICE O/P EST MOD 30 MIN: CPT | Performed by: INTERNAL MEDICINE

## 2021-10-25 PROCEDURE — 87427 SHIGA-LIKE TOXIN AG IA: CPT | Performed by: INTERNAL MEDICINE

## 2021-10-25 PROCEDURE — 89055 LEUKOCYTE ASSESSMENT FECAL: CPT | Performed by: INTERNAL MEDICINE

## 2021-10-25 RX ORDER — AMLODIPINE BESYLATE 5 MG/1
5 TABLET ORAL DAILY
Qty: 30 TABLET | Refills: 5 | Status: SHIPPED | OUTPATIENT
Start: 2021-10-25

## 2021-10-25 RX ORDER — LACTOBACILLUS ACIDOPHILUS 20B CELL
1 CAPSULE ORAL DAILY
Qty: 100 CAPSULE | Refills: 3 | Status: SHIPPED | OUTPATIENT
Start: 2021-10-25

## 2021-10-25 NOTE — H&P
[facility], [facility]     History and Physical    Vu Lopez Patient Status:  No patient class for patient encounter    5/15/1935 MRN [id number]   Location [address] [facility]  Attending No att.  p orientated no acute distress normocephalic nonicteric lungs clear. Cardiovascular system regular rate and rhythm abdomen soft nontender. Extremities no edema cyanosis or clubbing.       Results:     Lab Results   Component Value Date    WBC 7.5 10/21/2021

## 2021-10-25 NOTE — PROGRESS NOTES
Problem 1 hypertension Home blood pressure readings were reviewed with 50% of the readings were elevated in the 1 4150 range. He denies any headaches dizziness chest pain or shortness of breath. Patient still having diarrhea.   Did bring down some stool f

## 2021-11-01 DIAGNOSIS — D89.0 POLYCLONAL GAMMOPATHY: Primary | ICD-10-CM

## 2021-11-10 ENCOUNTER — TELEPHONE (OUTPATIENT)
Dept: INTERNAL MEDICINE CLINIC | Facility: CLINIC | Age: 86
End: 2021-11-10

## 2021-11-10 DIAGNOSIS — R19.7 DIARRHEA, UNSPECIFIED TYPE: Primary | ICD-10-CM

## 2021-11-10 NOTE — TELEPHONE ENCOUNTER
He has had this since he had Covid. He said he talked with you about it at the last visit and you advised him to contact you for a GI specialist name if it continued. He would like a name of GI specialist put in his cubby.

## 2021-11-29 ENCOUNTER — TELEPHONE (OUTPATIENT)
Dept: ORTHOPEDICS CLINIC | Facility: CLINIC | Age: 86
End: 2021-11-29

## 2021-11-29 DIAGNOSIS — M79.642 HAND PAIN, LEFT: Primary | ICD-10-CM

## 2021-11-29 NOTE — TELEPHONE ENCOUNTER
Patient has an appointment scheduled with Dr. Vandana Carmona on 12/23 for left hand middle finger dislocation. Patient stated this has been going on for years so it is not urgent. Please advise is imaging is needed.

## 2021-12-06 ENCOUNTER — OFFICE VISIT (OUTPATIENT)
Dept: INTERNAL MEDICINE CLINIC | Facility: CLINIC | Age: 86
End: 2021-12-06
Payer: MEDICARE

## 2021-12-06 VITALS
DIASTOLIC BLOOD PRESSURE: 60 MMHG | HEART RATE: 53 BPM | OXYGEN SATURATION: 97 % | WEIGHT: 205 LBS | RESPIRATION RATE: 19 BRPM | SYSTOLIC BLOOD PRESSURE: 125 MMHG | BODY MASS INDEX: 30 KG/M2

## 2021-12-06 DIAGNOSIS — E11.22 TYPE 2 DIABETES MELLITUS WITH ESRD (END-STAGE RENAL DISEASE) (HCC): ICD-10-CM

## 2021-12-06 DIAGNOSIS — N18.6 TYPE 2 DIABETES MELLITUS WITH ESRD (END-STAGE RENAL DISEASE) (HCC): ICD-10-CM

## 2021-12-06 DIAGNOSIS — R20.0 NUMBNESS OF RIGHT HAND: Primary | ICD-10-CM

## 2021-12-06 PROCEDURE — 99214 OFFICE O/P EST MOD 30 MIN: CPT | Performed by: INTERNAL MEDICINE

## 2021-12-06 RX ORDER — ALLOPURINOL 100 MG/1
TABLET ORAL
Qty: 135 TABLET | Refills: 3 | Status: SHIPPED | OUTPATIENT
Start: 2021-12-06

## 2021-12-06 RX ORDER — PREDNISONE 20 MG/1
20 TABLET ORAL 2 TIMES DAILY
Qty: 10 TABLET | Refills: 1 | Status: SHIPPED | OUTPATIENT
Start: 2021-12-06 | End: 2021-12-11

## 2021-12-06 NOTE — PROGRESS NOTES
Shirley Sesay is here with his friend. He has been complaining of pain in his foot. No history of any injury. Did go to urgent care [facility]. X-ray was taken negative for fracture. He was told he had plantar fasciitis and sent home.   He started taking allopurino

## 2021-12-13 ENCOUNTER — TELEPHONE (OUTPATIENT)
Dept: ORTHOPEDICS CLINIC | Facility: CLINIC | Age: 86
End: 2021-12-13

## 2021-12-13 NOTE — TELEPHONE ENCOUNTER
Patient is having issues with his RT Hand, specifically his pinky finger and starting into the ring finger. His PCP think it's Carpal Tunnel Syndrome and has order the NCV/EMG test, but they can't get him in for the test until January.   He has lost al

## 2021-12-17 NOTE — TELEPHONE ENCOUNTER
Please enter an order  for the patient to have the EMG/NCV completed. He is hoping to have the test before his appt with Dr. Cherylene League next week.     Future Appointments   Date Time Provider Ramses Buck   12/23/2021 11:15 AM Sean Cooper MD [facility]

## 2021-12-17 NOTE — TELEPHONE ENCOUNTER
Patient has not been seen by Dr Ev More yet, will need to be evaluated first in order to order EMG. Patient can use order issued by PCP (per initial message). Otherwise Dr Ev More can see him first and than order EMG.

## 2021-12-17 NOTE — TELEPHONE ENCOUNTER
Patient was advised to call Dr. Gómez Golf office back and let them know that he has a referral from his PCP. Patient verbalized understanding and will call the PCP's office.

## 2021-12-21 ENCOUNTER — OFFICE VISIT (OUTPATIENT)
Dept: PHYSICAL MEDICINE AND REHAB | Facility: CLINIC | Age: 86
End: 2021-12-21
Payer: MEDICARE

## 2021-12-21 VITALS
WEIGHT: 205 LBS | SYSTOLIC BLOOD PRESSURE: 132 MMHG | HEIGHT: 69 IN | DIASTOLIC BLOOD PRESSURE: 70 MMHG | HEART RATE: 57 BPM | BODY MASS INDEX: 30.36 KG/M2 | OXYGEN SATURATION: 97 %

## 2021-12-21 DIAGNOSIS — R29.898 RIGHT HAND WEAKNESS: Primary | ICD-10-CM

## 2021-12-23 ENCOUNTER — TELEPHONE (OUTPATIENT)
Dept: ORTHOPEDICS CLINIC | Facility: CLINIC | Age: 86
End: 2021-12-23

## 2021-12-23 ENCOUNTER — OFFICE VISIT (OUTPATIENT)
Dept: ORTHOPEDICS CLINIC | Facility: CLINIC | Age: 86
End: 2021-12-23
Payer: MEDICARE

## 2021-12-23 VITALS — HEART RATE: 52 BPM | OXYGEN SATURATION: 96 %

## 2021-12-23 DIAGNOSIS — M65.332 TRIGGER MIDDLE FINGER OF LEFT HAND: ICD-10-CM

## 2021-12-23 DIAGNOSIS — G56.21 CUBITAL TUNNEL SYNDROME ON RIGHT: ICD-10-CM

## 2021-12-23 DIAGNOSIS — G56.01 CARPAL TUNNEL SYNDROME OF RIGHT WRIST: Primary | ICD-10-CM

## 2021-12-23 DIAGNOSIS — M79.642 HAND PAIN, LEFT: ICD-10-CM

## 2021-12-23 PROCEDURE — 20550 NJX 1 TENDON SHEATH/LIGAMENT: CPT | Performed by: ORTHOPAEDIC SURGERY

## 2021-12-23 PROCEDURE — 99204 OFFICE O/P NEW MOD 45 MIN: CPT | Performed by: ORTHOPAEDIC SURGERY

## 2021-12-23 RX ORDER — TRIAMCINOLONE ACETONIDE 40 MG/ML
40 INJECTION, SUSPENSION INTRA-ARTICULAR; INTRAMUSCULAR ONCE
Status: COMPLETED | OUTPATIENT
Start: 2021-12-23 | End: 2021-12-23

## 2021-12-23 RX ADMIN — TRIAMCINOLONE ACETONIDE 40 MG: 40 INJECTION, SUSPENSION INTRA-ARTICULAR; INTRAMUSCULAR at 11:51:00

## 2021-12-23 NOTE — H&P
Clinic Note [facility]     Assessment/Plan:  80year old male    1. Right carpal tunnel syndrome–would recommend surgical decompression. Discussed risks associate surgery, expected outcomes, time recovery and the possible need for rehab.   Given the composite fist   Special Tests:  Normally perfused hand       Median Nerve Exam Right   Phdurkan +   Sensation abnormal: very slightly diminished   PCBr Sensation normal   APB Weakness No   Thenar Atrophy No     Ulnar Nerve Exam Right   Tinels neg   EF + by mouth. • amLODIPine 5 MG Oral Tab Take 1 tablet (5 mg total) by mouth daily. 30 tablet 5   • Lactobacillus (FLORAJEN ACIDOPHILUS) Oral Cap Take 1 tablet by mouth daily.  100 capsule 3   • furosemide 20 MG Oral Tab Take 1 tablet (20 mg total) by mouth quittin.0      Smokeless tobacco: Never Used      Tobacco comment: QUIT 48 YRS. AGO    Substance and Sexual Activity      Alcohol use:  Yes        Alcohol/week: 3.0 standard drinks        Types: 2 Glasses of wine, 1 Shots of liquor per week      Drug u

## 2021-12-23 NOTE — PROCEDURES
[address] [place] [address]  Phone: [phone]  Fax: [phone]    ELECTRODIAGNOSTIC REPORT          Patient: Mima Ramirez Hand Dominance:  right   Patient ID: [id number] Referring Dr:  Dr. Oneal Pino the specified normal range in all 2 of the tested nerves:  • In the R Median - Digit II (Antidromic) study  o the peak latency was increased for Wrist stimulation  o the peak amplitude was reduced for Wrist stimulation  o the peak-to-peak amplitude was red [address]  ________________________________        Motor NCS      Nerve / Sites Muscle Latency Amplitude Amp % Duration Segments Distance Lat Diff Velocity     ms mV % ms  cm ms m/s   R Median - APB      Wrist APB 5.1 N 2+ 2+ None None Normal N N N Reduced           Motor NCS Inching: R Ulnar - FDI

## 2021-12-23 NOTE — PROGRESS NOTES
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

## 2021-12-23 NOTE — TELEPHONE ENCOUNTER
Surgeon: Dr. Veronika Mederos    Date of Surgery: 2/16/22  CANCELED      Post Op Appt: 2/25/22     Facility: [facility]    Inpatient or Outpatient: Outpatient    Surgical Assistant: n/a    Preadmission Testing Ordered:  yes    Pre-Op Clearance Requested:   PCP: n/a   Cardiac: n/a   Pulmonary: n/a   Dental: n/a   Other: n/a    DME: n/a    Rehab Services Ordered:   Home Health: n/a   Outpatient: n/a    Is this work comp related? no    Prior Authorization: pending    Disability Paperwork: n/a    On [place] Calendar: yes    Notes:

## 2021-12-23 NOTE — PROCEDURES
Trigger Finger Injection:    Written consent was obtained. Skin was prepped with ChloraPrep. 1 mL of 40 mg of Kenalog and 1 mL of 1% lidocaine was injected into subcutaneous tissue overlying the A1 pulley of left middle finger.  Patient tolerated the proc

## 2021-12-23 NOTE — TELEPHONE ENCOUNTER
No prior auth or precert required for OP CPT Codes 61266 and 85124, medicare - for tracking purposes only

## 2021-12-29 ENCOUNTER — TELEPHONE (OUTPATIENT)
Dept: INTERNAL MEDICINE CLINIC | Facility: CLINIC | Age: 86
End: 2021-12-29

## 2021-12-29 ENCOUNTER — TELEPHONE (OUTPATIENT)
Dept: PHYSICAL MEDICINE AND REHAB | Facility: CLINIC | Age: 86
End: 2021-12-29

## 2021-12-29 NOTE — TELEPHONE ENCOUNTER
Called daughter and she was aware that the letter was faxed and a copy was put in the [facility]. Daughter also requested the letter to be faxed to her personal secured fax and that was done also.  Fax number [phone]

## 2021-12-29 NOTE — TELEPHONE ENCOUNTER
Patient's daughter left a VM asking for patient's 12/21/21 EMG results be posted to [employer]. EMG report sent to patient via [employer] letters tab.

## 2021-12-29 NOTE — TELEPHONE ENCOUNTER
Patient was referred to an orthopedic surgeon for carpal tunnel and is scheduled for surgery. Patients daughter wants to take her dad for a 2nd opinion to someone that is by [place] in another state. This person is a nerve specialist Dr Jennifer Berkowitz.  Mitesh Hodge

## 2022-02-08 ENCOUNTER — TELEPHONE (OUTPATIENT)
Dept: ORTHOPEDICS CLINIC | Facility: CLINIC | Age: 87
End: 2022-02-08

## 2022-02-08 NOTE — TELEPHONE ENCOUNTER
Patient called to request to cancel surgery on 02/16, because he is getting a second opinion, and will call back to re-schedule surgery date. Please call patient for further questions. Thanks.     Patient can be reached at [phone]

## 2022-02-10 ENCOUNTER — OFFICE VISIT (OUTPATIENT)
Dept: INTERNAL MEDICINE CLINIC | Facility: CLINIC | Age: 87
End: 2022-02-10
Payer: MEDICARE

## 2022-02-10 VITALS
BODY MASS INDEX: 30 KG/M2 | OXYGEN SATURATION: 98 % | HEART RATE: 57 BPM | SYSTOLIC BLOOD PRESSURE: 138 MMHG | RESPIRATION RATE: 16 BRPM | WEIGHT: 200 LBS | DIASTOLIC BLOOD PRESSURE: 80 MMHG

## 2022-02-10 DIAGNOSIS — E11.22 TYPE 2 DIABETES MELLITUS WITH STAGE 3B CHRONIC KIDNEY DISEASE, WITHOUT LONG-TERM CURRENT USE OF INSULIN (HCC): ICD-10-CM

## 2022-02-10 DIAGNOSIS — I10 ESSENTIAL HYPERTENSION: ICD-10-CM

## 2022-02-10 DIAGNOSIS — N18.32 TYPE 2 DIABETES MELLITUS WITH STAGE 3B CHRONIC KIDNEY DISEASE, WITHOUT LONG-TERM CURRENT USE OF INSULIN (HCC): ICD-10-CM

## 2022-02-10 DIAGNOSIS — R20.0 NUMBNESS OF RIGHT HAND: Primary | ICD-10-CM

## 2022-02-10 DIAGNOSIS — E78.5 DM TYPE 2 WITH DIABETIC DYSLIPIDEMIA (HCC): ICD-10-CM

## 2022-02-10 DIAGNOSIS — E11.69 DM TYPE 2 WITH DIABETIC DYSLIPIDEMIA (HCC): ICD-10-CM

## 2022-02-10 PROCEDURE — 99214 OFFICE O/P EST MOD 30 MIN: CPT | Performed by: INTERNAL MEDICINE

## 2022-02-10 RX ORDER — AMLODIPINE BESYLATE 5 MG/1
5 TABLET ORAL DAILY
Qty: 90 TABLET | Refills: 3 | Status: SHIPPED | OUTPATIENT
Start: 2022-02-10 | End: 2022-02-22

## 2022-02-10 NOTE — PROGRESS NOTES
Patient is here for 18 month follow up for Monoclonal Gammopathy. He has been getting labs drawn at [facility]. Feeling well. Here to discuss labs from October.      Education Record    Learner:  Patient    Disease / Diagnosis:  Monoclonal gammopathy     Barriers / Limitations:  None   Comments:    Method:  Discussion   Comments:    General Topics:  Plan of care reviewed   Comments:    Outcome:  Shows understanding   Comments:

## 2022-02-11 ENCOUNTER — OFFICE VISIT (OUTPATIENT)
Dept: HEMATOLOGY/ONCOLOGY | Facility: HOSPITAL | Age: 87
End: 2022-02-11
Attending: INTERNAL MEDICINE
Payer: MEDICARE

## 2022-02-11 VITALS
SYSTOLIC BLOOD PRESSURE: 167 MMHG | TEMPERATURE: 97 F | BODY MASS INDEX: 29.68 KG/M2 | WEIGHT: 200.38 LBS | OXYGEN SATURATION: 97 % | RESPIRATION RATE: 16 BRPM | DIASTOLIC BLOOD PRESSURE: 79 MMHG | HEIGHT: 69.02 IN | HEART RATE: 71 BPM

## 2022-02-11 DIAGNOSIS — D47.2 MONOCLONAL GAMMOPATHY: ICD-10-CM

## 2022-02-11 DIAGNOSIS — N18.32 TYPE 2 DIABETES MELLITUS WITH STAGE 3B CHRONIC KIDNEY DISEASE, WITHOUT LONG-TERM CURRENT USE OF INSULIN (HCC): ICD-10-CM

## 2022-02-11 DIAGNOSIS — E11.22 TYPE 2 DIABETES MELLITUS WITH STAGE 3B CHRONIC KIDNEY DISEASE, WITHOUT LONG-TERM CURRENT USE OF INSULIN (HCC): ICD-10-CM

## 2022-02-11 DIAGNOSIS — E78.5 DM TYPE 2 WITH DIABETIC DYSLIPIDEMIA (HCC): ICD-10-CM

## 2022-02-11 DIAGNOSIS — E11.69 DM TYPE 2 WITH DIABETIC DYSLIPIDEMIA (HCC): ICD-10-CM

## 2022-02-11 LAB
ALBUMIN SERPL-MCNC: 3.7 G/DL (ref 3.4–5)
ALBUMIN/GLOB SERPL: 0.8 {RATIO} (ref 1–2)
ALP LIVER SERPL-CCNC: 71 U/L
ALT SERPL-CCNC: 32 U/L
ANION GAP SERPL CALC-SCNC: 2 MMOL/L (ref 0–18)
AST SERPL-CCNC: 22 U/L (ref 15–37)
BASOPHILS # BLD AUTO: 0.01 X10(3) UL (ref 0–0.2)
BASOPHILS NFR BLD AUTO: 0.2 %
BILIRUB SERPL-MCNC: 0.7 MG/DL (ref 0.1–2)
BUN BLD-MCNC: 27 MG/DL (ref 7–18)
CALCIUM BLD-MCNC: 9.7 MG/DL (ref 8.5–10.1)
CHLORIDE SERPL-SCNC: 108 MMOL/L (ref 98–112)
CHOLEST SERPL-MCNC: 133 MG/DL (ref ?–200)
CO2 SERPL-SCNC: 28 MMOL/L (ref 21–32)
CREAT BLD-MCNC: 1.44 MG/DL
EOSINOPHIL # BLD AUTO: 0.07 X10(3) UL (ref 0–0.7)
EOSINOPHIL NFR BLD AUTO: 1.4 %
ERYTHROCYTE [DISTWIDTH] IN BLOOD BY AUTOMATED COUNT: 14.9 %
EST. AVERAGE GLUCOSE BLD GHB EST-MCNC: 131 MG/DL (ref 68–126)
FASTING PATIENT LIPID ANSWER: NO
FASTING STATUS PATIENT QL REPORTED: YES
GLOBULIN PLAS-MCNC: 4.4 G/DL (ref 2.8–4.4)
GLUCOSE BLD-MCNC: 121 MG/DL (ref 70–99)
HBA1C MFR BLD: 6.2 % (ref ?–5.7)
HCT VFR BLD AUTO: 43.8 %
HDLC SERPL-MCNC: 48 MG/DL (ref 40–59)
HGB BLD-MCNC: 14.2 G/DL
IGA SERPL-MCNC: 261 MG/DL (ref 70–312)
IGM SERPL-MCNC: 74.1 MG/DL (ref 43–279)
IMM GRANULOCYTES # BLD AUTO: 0.02 X10(3) UL (ref 0–1)
IMM GRANULOCYTES NFR BLD: 0.4 %
IMMUNOGLOBULIN PNL SER-MCNC: 1620 MG/DL (ref 791–1643)
LDLC SERPL CALC-MCNC: 70 MG/DL (ref ?–100)
LYMPHOCYTES # BLD AUTO: 1.16 X10(3) UL (ref 1–4)
LYMPHOCYTES NFR BLD AUTO: 22.9 %
MCH RBC QN AUTO: 29.7 PG (ref 26–34)
MCHC RBC AUTO-ENTMCNC: 32.4 G/DL (ref 31–37)
MCV RBC AUTO: 91.6 FL
MONOCYTES # BLD AUTO: 0.65 X10(3) UL (ref 0.1–1)
MONOCYTES NFR BLD AUTO: 12.8 %
NEUTROPHILS # BLD AUTO: 3.15 X10 (3) UL (ref 1.5–7.7)
NEUTROPHILS # BLD AUTO: 3.15 X10(3) UL (ref 1.5–7.7)
NEUTROPHILS NFR BLD AUTO: 62.3 %
NONHDLC SERPL-MCNC: 85 MG/DL (ref ?–130)
OSMOLALITY SERPL CALC.SUM OF ELEC: 292 MOSM/KG (ref 275–295)
PLATELET # BLD AUTO: 206 10(3)UL (ref 150–450)
POTASSIUM SERPL-SCNC: 4.7 MMOL/L (ref 3.5–5.1)
PROT SERPL-MCNC: 8.1 G/DL (ref 6.4–8.2)
RBC # BLD AUTO: 4.78 X10(6)UL
SODIUM SERPL-SCNC: 138 MMOL/L (ref 136–145)
TRIGL SERPL-MCNC: 74 MG/DL (ref 30–149)
VLDLC SERPL CALC-MCNC: 11 MG/DL (ref 0–30)
WBC # BLD AUTO: 5.1 X10(3) UL (ref 4–11)

## 2022-02-11 PROCEDURE — 99214 OFFICE O/P EST MOD 30 MIN: CPT | Performed by: INTERNAL MEDICINE

## 2022-02-12 ENCOUNTER — LAB ENCOUNTER (OUTPATIENT)
Dept: LAB | Facility: HOSPITAL | Age: 87
End: 2022-02-12
Attending: INTERNAL MEDICINE
Payer: MEDICARE

## 2022-02-12 DIAGNOSIS — D47.2 MONOCLONAL GAMMOPATHY: ICD-10-CM

## 2022-02-12 PROCEDURE — 84166 PROTEIN E-PHORESIS/URINE/CSF: CPT

## 2022-02-12 PROCEDURE — 86335 IMMUNFIX E-PHORSIS/URINE/CSF: CPT

## 2022-02-12 PROCEDURE — 84156 ASSAY OF PROTEIN URINE: CPT

## 2022-02-13 LAB
M PROTEIN 24H UR ELPH-MRATE: 199.5 MG/24 HR (ref ?–149.1)
SPECIMEN VOL UR: 2100 ML

## 2022-02-15 NOTE — PROGRESS NOTES
[facility]    PATIENT'S NAME: Tari Stephens   ATTENDING PHYSICIAN: Corina Wrihgt M.D. PATIENT ACCOUNT #: [de-identified] LOCATION: [address] [place] [address] RECORD #: [id number] YOB: 1935   DATE OF SERVICE: 02/11/2022       CANCER CENTER PROGRESS NOTE    CHIEF COMPLAINT:  Followup and treatment of monoclonal gammopathy of undetermined significance. HISTORY OF PRESENT ILLNESS:  The patient is an 51-year-old male. He was noted to have evidence of monoclonal gammopathy with elevated light chains in December of 2020. He had monoclonal protein and immunofixation consistent with free lambda light chain that was elevated. He has underlying history of chronic kidney disease. His lambda light chain is detectable by immunofixation in the urine. He saw me a year and a half ago, and he has not had any major issues since that time related to this specific issue. He sees Dr. Lori Crowe for his general medical care. He has history of type 2 diabetes with chronic kidney disease and hypertension. He otherwise denies any other major new issues. He has no cough, no shortness of breath. No abdominal pain, nausea, or vomiting. He has not had any increase frequency of infections. He is fairly robust despite his age and has lived at [facility] for many years. He does get some testing done through the [place]. MEDICATIONS:  His current medications include acetaminophen p.r.n.; allopurinol 150 mg daily; amlodipine 5 mg daily; amoxicillin prior to dental work; aspirin 81 mg daily; atenolol 25 mg daily; enalapril 20 mg b.i.d.; finasteride 5 mg daily; furosemide 20 mg daily; ipratropium bromide nasal spray 2 sprays twice daily; Florajen probiotic daily; loratadine 10 mg daily p.r.n.; magnesium oxide 400 mg twice daily; omeprazole 20 mg daily; valacyclovir 500 mg twice a week; vitamin D3, 2000 units daily. PHYSICAL EXAMINATION:    GENERAL:  He is a relatively well-appearing male.   He is in no acute distress. VITAL SIGNS:  His performance status is 0. His weight is 200 pounds. He is 5 feet 9 inches. Blood pressure is 167/79, pulse 71, respiratory rate is 20, temperature is 97.0. HEENT:  Unremarkable. LYMPHATICS:  He has no adenopathy. LUNGS:  Clear. HEART:  Normal.  ABDOMEN:  No hepatosplenomegaly or tenderness. EXTREMITIES:   He has no clubbing, cyanosis, or edema. NEUROLOGIC:  He is intact. LABORATORY DATA:  Labs today, his CBC is normal.  His quantitative immunoglobulins are normal.  His BUN and creatinine are 27 and 1.44, which are stable. Hemoglobin A1c was 6.2. He does have a monoclonal protein study with serum free light chains pending. I did ask him to obtain a 24-hour urine for Bence-Marroquin protein. IMPRESSION:  Monoclonal gammopathy of undetermined significance. He had primarily light chains as his paraprotein. Given this finding, I am asking him to do a 24-hour urine with Bence-Marroquin protein. We will see if his protein output has increased or in fact, he has any increase in his serum free light chains. If these are both stable, I would suggest that he will only need followup on an annual basis. I have given a tentative appointment for 1 year for now with repeat labs. I will contact him once the other testing is back.     Dictated By Libby Mckinley M.D.  d: 02/14/2022 13:42:40  t: 02/15/2022 01:20:18  Job [id number]  WQ/

## 2022-02-16 ENCOUNTER — TELEPHONE (OUTPATIENT)
Dept: HEMATOLOGY/ONCOLOGY | Facility: HOSPITAL | Age: 87
End: 2022-02-16

## 2022-02-16 NOTE — TELEPHONE ENCOUNTER
Left patient a voicemail - told him 24 hour urine test came out fine. NO concerns about MGUS progressing. Will see in a year.   JAMIE Edwards

## 2022-02-22 RX ORDER — AMLODIPINE BESYLATE 5 MG/1
5 TABLET ORAL DAILY
Qty: 90 TABLET | Refills: 3 | Status: SHIPPED | OUTPATIENT
Start: 2022-02-22 | End: 2023-02-17

## 2022-02-22 NOTE — TELEPHONE ENCOUNTER
Patient states he called [facility] to check on his refill for Amlodipine 5 mg because he has not received the medication yet. Patient calling to make sure the script went to the [facility] and not [facility]. Please resend script.

## 2022-02-24 LAB
ALBUMIN SERPL ELPH-MCNC: 4.13 G/DL (ref 3.75–5.21)
ALBUMIN/GLOB SERPL: 1.16 {RATIO} (ref 1–2)
ALPHA1 GLOB SERPL ELPH-MCNC: 0.28 G/DL (ref 0.19–0.46)
ALPHA2 GLOB SERPL ELPH-MCNC: 0.85 G/DL (ref 0.48–1.05)
B-GLOBULIN SERPL ELPH-MCNC: 0.75 G/DL (ref 0.68–1.23)
GAMMA GLOB SERPL ELPH-MCNC: 1.69 G/DL (ref 0.62–1.7)
KAPPA LC FREE SER-MCNC: 6.56 MG/DL (ref 0.33–1.94)
KAPPA LC FREE/LAMBDA FREE SER NEPH: 1.63 {RATIO} (ref 0.26–1.65)
LAMBDA LC FREE SERPL-MCNC: 4.02 MG/DL (ref 0.57–2.63)
PROT SERPL-MCNC: 7.7 G/DL (ref 6.4–8.2)

## 2022-04-07 ENCOUNTER — NURSE ONLY (OUTPATIENT)
Dept: INTERNAL MEDICINE CLINIC | Facility: CLINIC | Age: 87
End: 2022-04-07
Payer: MEDICARE

## 2022-07-19 ENCOUNTER — OFFICE VISIT (OUTPATIENT)
Dept: INTERNAL MEDICINE CLINIC | Facility: CLINIC | Age: 87
End: 2022-07-19
Payer: MEDICARE

## 2022-07-19 VITALS
OXYGEN SATURATION: 98 % | WEIGHT: 205 LBS | TEMPERATURE: 98 F | BODY MASS INDEX: 30 KG/M2 | HEART RATE: 60 BPM | DIASTOLIC BLOOD PRESSURE: 80 MMHG | SYSTOLIC BLOOD PRESSURE: 130 MMHG

## 2022-07-19 DIAGNOSIS — M10.9 ACUTE GOUT OF RIGHT FOOT, UNSPECIFIED CAUSE: Primary | ICD-10-CM

## 2022-07-19 PROCEDURE — 99213 OFFICE O/P EST LOW 20 MIN: CPT | Performed by: INTERNAL MEDICINE

## 2022-07-19 RX ORDER — PREDNISONE 20 MG/1
20 TABLET ORAL 2 TIMES DAILY
Qty: 14 TABLET | Refills: 0 | Status: SHIPPED | OUTPATIENT
Start: 2022-07-19 | End: 2022-07-26

## 2022-07-19 RX ORDER — AZITHROMYCIN 250 MG/1
TABLET, FILM COATED ORAL
Qty: 6 TABLET | Refills: 0 | Status: SHIPPED | OUTPATIENT
Start: 2022-07-19 | End: 2022-07-24

## 2022-07-19 NOTE — PROGRESS NOTES
Zeny Horowitz developed redness and swelling right foot on Saturday [place] Was diagnosed with gout he was started on colchicine. In the past we did have him on allopurinol but he stopped a couple months ago. States that he has not had a gouty attack for a long period time but once again he did stop his allopurinol couple months ago. He started the colchicine without any improvement. On examination right foot and ankle red hot swollen tender to touch. Impression acute gout    Plan he is to finish his colchicine for also start him on prednisone 20 mg twice daily first dose tonight. I asked him to restart his allopurinol tomorrow morning. We did inform him that once he starts allopurinol because of gout flareup but we have gotten covered with the prednisone. We did ask him to give me a call Wednesday morning and let me know how he is doing. Note patient is going on a  river cruise. Would like prescription for Z-Ayan.

## 2022-07-20 ENCOUNTER — TELEPHONE (OUTPATIENT)
Dept: INTERNAL MEDICINE CLINIC | Facility: CLINIC | Age: 87
End: 2022-07-20

## 2022-07-20 NOTE — TELEPHONE ENCOUNTER
Patient stated that he was to restart his Allopurinol. He needs a printed script to take with him to the [place].  Needs to be for Allopurinol 300 mg take 1 tablet daily

## 2022-07-21 ENCOUNTER — TELEPHONE (OUTPATIENT)
Dept: INTERNAL MEDICINE CLINIC | Facility: CLINIC | Age: 87
End: 2022-07-21

## 2022-07-21 RX ORDER — ALLOPURINOL 300 MG/1
300 TABLET ORAL DAILY
Refills: 0 | Status: CANCELLED | OUTPATIENT
Start: 2022-07-21

## 2022-07-21 RX ORDER — ALLOPURINOL 300 MG/1
300 TABLET ORAL DAILY
Qty: 90 TABLET | Refills: 3 | Status: SHIPPED | OUTPATIENT
Start: 2022-07-21

## 2022-07-21 NOTE — TELEPHONE ENCOUNTER
Patient came in to the office and RX was given to the patient to take to the [place].  RX was verbally okayed by Dr Selwyn Boas

## 2022-08-02 ENCOUNTER — TELEPHONE (OUTPATIENT)
Dept: INTERNAL MEDICINE CLINIC | Facility: CLINIC | Age: 87
End: 2022-08-02

## 2022-08-02 ENCOUNTER — OFFICE VISIT (OUTPATIENT)
Dept: INTERNAL MEDICINE CLINIC | Facility: CLINIC | Age: 87
End: 2022-08-02
Payer: MEDICARE

## 2022-08-02 VITALS
SYSTOLIC BLOOD PRESSURE: 130 MMHG | BODY MASS INDEX: 30 KG/M2 | RESPIRATION RATE: 17 BRPM | WEIGHT: 202 LBS | OXYGEN SATURATION: 95 % | DIASTOLIC BLOOD PRESSURE: 70 MMHG | HEART RATE: 64 BPM

## 2022-08-02 DIAGNOSIS — M10.9 ACUTE GOUT OF RIGHT FOOT, UNSPECIFIED CAUSE: Primary | ICD-10-CM

## 2022-08-02 PROBLEM — E11.22 TYPE 2 DIABETES MELLITUS WITH ESRD (END-STAGE RENAL DISEASE) (HCC): Status: ACTIVE | Noted: 2018-04-30

## 2022-08-02 PROBLEM — N18.6 TYPE 2 DIABETES MELLITUS WITH ESRD (END-STAGE RENAL DISEASE) (HCC): Status: ACTIVE | Noted: 2018-04-30

## 2022-08-02 PROCEDURE — 99213 OFFICE O/P EST LOW 20 MIN: CPT | Performed by: INTERNAL MEDICINE

## 2022-08-02 RX ORDER — PREDNISONE 20 MG/1
20 TABLET ORAL 2 TIMES DAILY
Qty: 14 TABLET | Refills: 0 | Status: SHIPPED | OUTPATIENT
Start: 2022-08-02 | End: 2022-08-09

## 2022-08-02 RX ORDER — ALLOPURINOL 300 MG/1
TABLET ORAL
Qty: 1 TABLET | Refills: 0 | Status: SHIPPED | OUTPATIENT
Start: 2022-08-02

## 2022-08-02 NOTE — TELEPHONE ENCOUNTER
Patient states that he called the [facility] to check if the prescription that Dr. Bhavesh Leiva was going to start him on after seeing him at an office visit this morning was ready. The pharmacy told him that they did not receive a script from Dr. Bhavesh Leiva.     Prednisone 20 mg called in the [place] with the pharmacist.

## 2022-08-02 NOTE — TELEPHONE ENCOUNTER
Patient states that he is still having the gout issue even with the medication he was given. Wants to know if he can get a different medication called in? Called patient back with no response. Not sure if he has restarted his allopurinol or not.

## 2022-09-28 ENCOUNTER — OFFICE VISIT (OUTPATIENT)
Dept: ORTHOPEDICS CLINIC | Facility: CLINIC | Age: 87
End: 2022-09-28

## 2022-09-28 VITALS — WEIGHT: 200 LBS | HEIGHT: 69 IN | BODY MASS INDEX: 29.62 KG/M2

## 2022-09-28 DIAGNOSIS — R26.9 GAIT ABNORMALITY: ICD-10-CM

## 2022-09-28 DIAGNOSIS — Z91.81 AT RISK FOR FALLS: ICD-10-CM

## 2022-09-28 DIAGNOSIS — E11.9 DIABETES MELLITUS TYPE 2 IN NONOBESE (HCC): ICD-10-CM

## 2022-09-28 DIAGNOSIS — M21.70 LOWER LIMB LENGTH DIFFERENCE: Primary | ICD-10-CM

## 2022-09-28 PROBLEM — E11.22 TYPE 2 DIABETES MELLITUS WITH DIABETIC CHRONIC KIDNEY DISEASE (HCC): Status: ACTIVE | Noted: 2022-09-28

## 2022-09-28 PROCEDURE — 99203 OFFICE O/P NEW LOW 30 MIN: CPT | Performed by: PODIATRIST

## 2022-09-28 PROCEDURE — 1126F AMNT PAIN NOTED NONE PRSNT: CPT | Performed by: PODIATRIST

## 2022-10-26 ENCOUNTER — OFFICE VISIT (OUTPATIENT)
Dept: INTERNAL MEDICINE CLINIC | Facility: CLINIC | Age: 87
End: 2022-10-26
Payer: MEDICARE

## 2022-10-26 VITALS
DIASTOLIC BLOOD PRESSURE: 70 MMHG | OXYGEN SATURATION: 99 % | HEART RATE: 64 BPM | SYSTOLIC BLOOD PRESSURE: 130 MMHG | RESPIRATION RATE: 12 BRPM

## 2022-10-26 DIAGNOSIS — D47.2 MONOCLONAL GAMMOPATHIES, BENIGN: ICD-10-CM

## 2022-10-26 DIAGNOSIS — E11.22 TYPE 2 DIABETES MELLITUS WITH STAGE 3B CHRONIC KIDNEY DISEASE, WITHOUT LONG-TERM CURRENT USE OF INSULIN (HCC): ICD-10-CM

## 2022-10-26 DIAGNOSIS — I10 ESSENTIAL HYPERTENSION: ICD-10-CM

## 2022-10-26 DIAGNOSIS — Z90.5 STATUS POST NEPHRECTOMY: ICD-10-CM

## 2022-10-26 DIAGNOSIS — N18.32 STAGE 3B CHRONIC KIDNEY DISEASE (HCC): Primary | ICD-10-CM

## 2022-10-26 DIAGNOSIS — N40.1 BENIGN PROSTATIC HYPERPLASIA WITH WEAK URINARY STREAM: ICD-10-CM

## 2022-10-26 DIAGNOSIS — S72.041D CLOSED DISPLACED BASICERVICAL FRACTURE OF RIGHT FEMUR WITH ROUTINE HEALING: ICD-10-CM

## 2022-10-26 DIAGNOSIS — N18.32 TYPE 2 DIABETES MELLITUS WITH STAGE 3B CHRONIC KIDNEY DISEASE, WITHOUT LONG-TERM CURRENT USE OF INSULIN (HCC): ICD-10-CM

## 2022-10-26 DIAGNOSIS — Z00.00 ENCOUNTER FOR ANNUAL HEALTH EXAMINATION: ICD-10-CM

## 2022-10-26 DIAGNOSIS — R39.12 BENIGN PROSTATIC HYPERPLASIA WITH WEAK URINARY STREAM: ICD-10-CM

## 2022-10-26 DIAGNOSIS — H54.40 BLINDNESS OF RIGHT EYE WITH NORMAL VISION IN CONTRALATERAL EYE: ICD-10-CM

## 2022-10-26 DIAGNOSIS — M1A.9XX0 CHRONIC GOUT WITHOUT TOPHUS, UNSPECIFIED CAUSE, UNSPECIFIED SITE: ICD-10-CM

## 2022-10-26 LAB
ALBUMIN SERPL-MCNC: 3.5 G/DL (ref 3.4–5)
ALBUMIN/GLOB SERPL: 0.8 {RATIO} (ref 1–2)
ALP LIVER SERPL-CCNC: 61 U/L
ALT SERPL-CCNC: 27 U/L
ANION GAP SERPL CALC-SCNC: 4 MMOL/L (ref 0–18)
AST SERPL-CCNC: 17 U/L (ref 15–37)
BILIRUB SERPL-MCNC: 0.5 MG/DL (ref 0.1–2)
BUN BLD-MCNC: 26 MG/DL (ref 7–18)
CALCIUM BLD-MCNC: 9.7 MG/DL (ref 8.5–10.1)
CHLORIDE SERPL-SCNC: 105 MMOL/L (ref 98–112)
CO2 SERPL-SCNC: 27 MMOL/L (ref 21–32)
CREAT BLD-MCNC: 1.61 MG/DL
EST. AVERAGE GLUCOSE BLD GHB EST-MCNC: 146 MG/DL (ref 68–126)
FASTING STATUS PATIENT QL REPORTED: NO
GFR SERPLBLD BASED ON 1.73 SQ M-ARVRAT: 41 ML/MIN/1.73M2 (ref 60–?)
GLOBULIN PLAS-MCNC: 4.3 G/DL (ref 2.8–4.4)
GLUCOSE BLD-MCNC: 102 MG/DL (ref 70–99)
HBA1C MFR BLD: 6.7 % (ref ?–5.7)
OSMOLALITY SERPL CALC.SUM OF ELEC: 287 MOSM/KG (ref 275–295)
POTASSIUM SERPL-SCNC: 4.8 MMOL/L (ref 3.5–5.1)
PROT SERPL-MCNC: 7.8 G/DL (ref 6.4–8.2)
SODIUM SERPL-SCNC: 136 MMOL/L (ref 136–145)

## 2022-10-26 PROCEDURE — 80053 COMPREHEN METABOLIC PANEL: CPT | Performed by: INTERNAL MEDICINE

## 2022-10-26 PROCEDURE — 83036 HEMOGLOBIN GLYCOSYLATED A1C: CPT | Performed by: INTERNAL MEDICINE

## 2022-10-28 ENCOUNTER — TELEPHONE (OUTPATIENT)
Dept: ORTHOPEDICS CLINIC | Facility: CLINIC | Age: 87
End: 2022-10-28

## 2022-10-28 NOTE — TELEPHONE ENCOUNTER
Javier Gabriel at [facility] called regarding a script for patient's orthotics. They would like to know if the order is for a lift/custom insert with lift.  Please advise thank you    [facility] phone: [phone]

## 2022-10-28 NOTE — TELEPHONE ENCOUNTER
Contacted [facility] and spoke with the  who stated that Estuardo Coy was out for the day. Left a message for him with Dr. Bhat Speaker confirmation that the order is for a custom orthotic with a lift.

## 2022-11-08 ENCOUNTER — OFFICE VISIT (OUTPATIENT)
Dept: INTERNAL MEDICINE CLINIC | Facility: CLINIC | Age: 87
End: 2022-11-08
Payer: MEDICARE

## 2022-11-08 VITALS
WEIGHT: 203 LBS | DIASTOLIC BLOOD PRESSURE: 70 MMHG | SYSTOLIC BLOOD PRESSURE: 130 MMHG | BODY MASS INDEX: 30 KG/M2 | HEART RATE: 61 BPM | OXYGEN SATURATION: 99 % | RESPIRATION RATE: 18 BRPM

## 2022-11-08 DIAGNOSIS — T56.0X1A ACUTE LEAD-INDUCED GOUT INVOLVING TOE OF RIGHT FOOT, INITIAL ENCOUNTER: Primary | ICD-10-CM

## 2022-11-08 DIAGNOSIS — M10.171 ACUTE LEAD-INDUCED GOUT INVOLVING TOE OF RIGHT FOOT, INITIAL ENCOUNTER: Primary | ICD-10-CM

## 2022-11-08 PROCEDURE — 99213 OFFICE O/P EST LOW 20 MIN: CPT | Performed by: INTERNAL MEDICINE

## 2022-11-08 RX ORDER — PREDNISONE 20 MG/1
20 TABLET ORAL 2 TIMES DAILY
Qty: 20 TABLET | Refills: 0 | Status: SHIPPED | OUTPATIENT
Start: 2022-11-08 | End: 2022-11-15

## 2022-11-08 NOTE — PROGRESS NOTES
Pain in the third toe right foot no history of any injury    Impression acute gouty attack    Plan prednisone 20 mg twice daily x10 days.

## 2022-12-27 ENCOUNTER — TELEPHONE (OUTPATIENT)
Dept: INTERNAL MEDICINE CLINIC | Facility: CLINIC | Age: 87
End: 2022-12-27

## 2022-12-27 RX ORDER — AZITHROMYCIN 250 MG/1
TABLET, FILM COATED ORAL
Qty: 6 TABLET | Refills: 0 | Status: SHIPPED | OUTPATIENT
Start: 2022-12-27 | End: 2023-01-01

## 2023-01-26 ENCOUNTER — TELEPHONE (OUTPATIENT)
Dept: ORTHOPEDICS CLINIC | Facility: CLINIC | Age: 88
End: 2023-01-26

## 2023-01-26 DIAGNOSIS — M25.561 RIGHT KNEE PAIN, UNSPECIFIED CHRONICITY: Primary | ICD-10-CM

## 2023-01-26 DIAGNOSIS — Z01.89 ENCOUNTER FOR LOWER EXTREMITY COMPARISON IMAGING STUDY: ICD-10-CM

## 2023-01-26 NOTE — TELEPHONE ENCOUNTER
Patient called and wanted a gel injection. Patient hasn't seen Dr. Yashira Cates in a few years for his right knee. I told patient he would need to come in and get reevaluated since he was last seen at [facility]. Please advise if patient needs to get imaging again.   Future Appointments   Date Time Provider Ramses Buck   1/30/2023  9:30 AM Gogo Solis MD [facility]   2/2/2023 10:40 AM Saray Nettles MD [facility] [id number]

## 2023-01-26 NOTE — TELEPHONE ENCOUNTER
Noted below. Yes, re-eval in office and updated images needed first.   Orderd BOTH left and right knees. Left knee for comparison views only. Please schedule both. Thank you!

## 2023-01-30 ENCOUNTER — OFFICE VISIT (OUTPATIENT)
Dept: INTERNAL MEDICINE CLINIC | Facility: CLINIC | Age: 88
End: 2023-01-30
Payer: MEDICARE

## 2023-01-30 VITALS
HEART RATE: 53 BPM | WEIGHT: 203.63 LBS | OXYGEN SATURATION: 97 % | SYSTOLIC BLOOD PRESSURE: 145 MMHG | TEMPERATURE: 97 F | BODY MASS INDEX: 30 KG/M2 | DIASTOLIC BLOOD PRESSURE: 80 MMHG

## 2023-01-30 DIAGNOSIS — I10 ESSENTIAL HYPERTENSION: ICD-10-CM

## 2023-01-30 DIAGNOSIS — E78.5 DM TYPE 2 WITH DIABETIC DYSLIPIDEMIA (HCC): Primary | ICD-10-CM

## 2023-01-30 DIAGNOSIS — E11.69 DM TYPE 2 WITH DIABETIC DYSLIPIDEMIA (HCC): Primary | ICD-10-CM

## 2023-01-30 LAB
EST. AVERAGE GLUCOSE BLD GHB EST-MCNC: 131 MG/DL (ref 68–126)
HBA1C MFR BLD: 6.2 % (ref ?–5.7)

## 2023-01-30 PROCEDURE — 83036 HEMOGLOBIN GLYCOSYLATED A1C: CPT | Performed by: INTERNAL MEDICINE

## 2023-01-30 PROCEDURE — 99214 OFFICE O/P EST MOD 30 MIN: CPT | Performed by: INTERNAL MEDICINE

## 2023-01-30 RX ORDER — TAMSULOSIN HYDROCHLORIDE 0.4 MG/1
0.4 CAPSULE ORAL DAILY
COMMUNITY
Start: 2022-06-03 | End: 2023-06-03

## 2023-01-30 NOTE — PROGRESS NOTES
Is a pleasant 49-year-old patient with history of diabetes. Denies any polyuria dips or aphasia is up-to-date eye exam.  Blood pressure is elevated. But he denies any headaches dizziness chest pain shortness of breath. Pt is to have urolift in 2 weeks    Physical examination very pleasant individual no acute distress normocephalic nonicteric. Carotids 1+ no bruits no thyromegaly or bruit. Lungs clear to auscultation. Cardiovascular system regular rate and rhythm extremities no edema cyanosis or clubbing monofilament normal in the feet. Prostate - BPH    Impression #1 diabetes mellitus check A1c return office 3 months problem #2 elevated blood pressure patient with hypertension. Patient is to monitor record blood pressure readings return to office 3-month #3 BPH Pt cleared for Urolift.

## 2023-02-02 ENCOUNTER — TELEPHONE (OUTPATIENT)
Dept: INTERNAL MEDICINE CLINIC | Facility: CLINIC | Age: 88
End: 2023-02-02

## 2023-02-02 ENCOUNTER — HOSPITAL ENCOUNTER (OUTPATIENT)
Dept: GENERAL RADIOLOGY | Age: 88
Discharge: HOME | End: 2023-02-02
Attending: ORTHOPAEDIC SURGERY
Payer: MEDICARE

## 2023-02-02 ENCOUNTER — OFFICE VISIT (OUTPATIENT)
Dept: ORTHOPEDICS CLINIC | Facility: CLINIC | Age: 88
End: 2023-02-02
Payer: MEDICARE

## 2023-02-02 DIAGNOSIS — G89.29 CHRONIC PAIN OF RIGHT KNEE: Primary | ICD-10-CM

## 2023-02-02 DIAGNOSIS — M25.561 RIGHT KNEE PAIN, UNSPECIFIED CHRONICITY: ICD-10-CM

## 2023-02-02 DIAGNOSIS — M25.561 CHRONIC PAIN OF RIGHT KNEE: Primary | ICD-10-CM

## 2023-02-02 DIAGNOSIS — Z01.89 ENCOUNTER FOR LOWER EXTREMITY COMPARISON IMAGING STUDY: ICD-10-CM

## 2023-02-02 PROCEDURE — 99214 OFFICE O/P EST MOD 30 MIN: CPT | Performed by: ORTHOPAEDIC SURGERY

## 2023-02-02 PROCEDURE — 73564 X-RAY EXAM KNEE 4 OR MORE: CPT | Performed by: ORTHOPAEDIC SURGERY

## 2023-02-02 PROCEDURE — 73562 X-RAY EXAM OF KNEE 3: CPT | Performed by: ORTHOPAEDIC SURGERY

## 2023-02-02 PROCEDURE — 1126F AMNT PAIN NOTED NONE PRSNT: CPT | Performed by: ORTHOPAEDIC SURGERY

## 2023-02-02 PROCEDURE — 20610 DRAIN/INJ JOINT/BURSA W/O US: CPT | Performed by: ORTHOPAEDIC SURGERY

## 2023-02-02 RX ORDER — TRIAMCINOLONE ACETONIDE 40 MG/ML
40 INJECTION, SUSPENSION INTRA-ARTICULAR; INTRAMUSCULAR ONCE
Status: COMPLETED | OUTPATIENT
Start: 2023-02-02 | End: 2023-02-02

## 2023-02-02 RX ADMIN — TRIAMCINOLONE ACETONIDE 40 MG: 40 INJECTION, SUSPENSION INTRA-ARTICULAR; INTRAMUSCULAR at 11:02:00

## 2023-02-02 NOTE — TELEPHONE ENCOUNTER
Patient states that he is having surgery (urolift) on 2/15/23 and needs a history and physical.  He states that he was just seen 2 days ago, does he need to come in again? He said \" the surgeon said he was fine if his doctor clears him from that visit\". Please advise.

## 2023-02-02 NOTE — TELEPHONE ENCOUNTER
Patient informed of no appt needed for pre-op as per Dr. Mala Chang      Patient states he is seeing the pre-op nurse on Monday 2/6/23 and would like to have a note saying that he can proceed with procedure.

## 2023-02-13 ENCOUNTER — LAB ENCOUNTER (OUTPATIENT)
Dept: LAB | Age: 88
End: 2023-02-13
Attending: UROLOGY
Payer: MEDICARE

## 2023-02-13 ENCOUNTER — OFFICE VISIT (OUTPATIENT)
Dept: INTERNAL MEDICINE CLINIC | Facility: CLINIC | Age: 88
End: 2023-02-13
Payer: MEDICARE

## 2023-02-13 VITALS
HEART RATE: 70 BPM | SYSTOLIC BLOOD PRESSURE: 130 MMHG | TEMPERATURE: 99 F | DIASTOLIC BLOOD PRESSURE: 70 MMHG | WEIGHT: 203.38 LBS | OXYGEN SATURATION: 98 % | BODY MASS INDEX: 30 KG/M2

## 2023-02-13 DIAGNOSIS — M10.372 ACUTE GOUT DUE TO RENAL IMPAIRMENT INVOLVING LEFT FOOT: Primary | ICD-10-CM

## 2023-02-13 DIAGNOSIS — N18.32 STAGE 3B CHRONIC KIDNEY DISEASE (HCC): ICD-10-CM

## 2023-02-13 DIAGNOSIS — Z00.00 EXAMINATION: Primary | ICD-10-CM

## 2023-02-13 LAB
ALBUMIN SERPL-MCNC: 3.7 G/DL (ref 3.4–5)
ALBUMIN/GLOB SERPL: 0.8 {RATIO} (ref 1–2)
ALP LIVER SERPL-CCNC: 71 U/L
ALT SERPL-CCNC: 21 U/L
ANION GAP SERPL CALC-SCNC: 5 MMOL/L (ref 0–18)
ANION GAP SERPL CALC-SCNC: 6 MMOL/L (ref 0–18)
AST SERPL-CCNC: 19 U/L (ref 15–37)
BILIRUB SERPL-MCNC: 0.9 MG/DL (ref 0.1–2)
BUN BLD-MCNC: 30 MG/DL (ref 7–18)
BUN BLD-MCNC: 31 MG/DL (ref 7–18)
CALCIUM BLD-MCNC: 9.6 MG/DL (ref 8.5–10.1)
CALCIUM BLD-MCNC: 9.7 MG/DL (ref 8.5–10.1)
CHLORIDE SERPL-SCNC: 104 MMOL/L (ref 98–112)
CHLORIDE SERPL-SCNC: 104 MMOL/L (ref 98–112)
CO2 SERPL-SCNC: 24 MMOL/L (ref 21–32)
CO2 SERPL-SCNC: 27 MMOL/L (ref 21–32)
CREAT BLD-MCNC: 1.45 MG/DL
CREAT BLD-MCNC: 1.57 MG/DL
FASTING STATUS PATIENT QL REPORTED: YES
GFR SERPLBLD BASED ON 1.73 SQ M-ARVRAT: 42 ML/MIN/1.73M2 (ref 60–?)
GFR SERPLBLD BASED ON 1.73 SQ M-ARVRAT: 47 ML/MIN/1.73M2 (ref 60–?)
GLOBULIN PLAS-MCNC: 4.5 G/DL (ref 2.8–4.4)
GLUCOSE BLD-MCNC: 108 MG/DL (ref 70–99)
GLUCOSE BLD-MCNC: 110 MG/DL (ref 70–99)
OSMOLALITY SERPL CALC.SUM OF ELEC: 285 MOSM/KG (ref 275–295)
OSMOLALITY SERPL CALC.SUM OF ELEC: 289 MOSM/KG (ref 275–295)
POTASSIUM SERPL-SCNC: 4.3 MMOL/L (ref 3.5–5.1)
POTASSIUM SERPL-SCNC: 4.3 MMOL/L (ref 3.5–5.1)
PROT SERPL-MCNC: 8.2 G/DL (ref 6.4–8.2)
SODIUM SERPL-SCNC: 134 MMOL/L (ref 136–145)
SODIUM SERPL-SCNC: 136 MMOL/L (ref 136–145)
URATE SERPL-MCNC: 6.7 MG/DL

## 2023-02-13 PROCEDURE — 99213 OFFICE O/P EST LOW 20 MIN: CPT | Performed by: INTERNAL MEDICINE

## 2023-02-13 PROCEDURE — 80053 COMPREHEN METABOLIC PANEL: CPT | Performed by: INTERNAL MEDICINE

## 2023-02-13 PROCEDURE — 36415 COLL VENOUS BLD VENIPUNCTURE: CPT

## 2023-02-13 PROCEDURE — 84550 ASSAY OF BLOOD/URIC ACID: CPT | Performed by: INTERNAL MEDICINE

## 2023-02-13 PROCEDURE — 80048 BASIC METABOLIC PNL TOTAL CA: CPT

## 2023-02-13 RX ORDER — SULFAMETHOXAZOLE AND TRIMETHOPRIM 800; 160 MG/1; MG/1
TABLET ORAL
COMMUNITY
Start: 2023-02-06

## 2023-02-13 RX ORDER — PREDNISONE 20 MG/1
20 TABLET ORAL 2 TIMES DAILY
Qty: 21 TABLET | Refills: 0 | Status: SHIPPED | OUTPATIENT
Start: 2023-02-13 | End: 2023-02-20

## 2023-02-13 NOTE — PROGRESS NOTES
Is a pleasant 66-year-old gentleman with a history of gout. Presents with a red hot painful left foot. Patient is on allopurinol 150 mg daily. Takes on a regular basis. Has approximately 2-3 drinks per week. On examination left foot red hot and painful. Impression acute gout in a patient with chronic kidney disease with GFR in the 40 range. We will treat with prednisone 20 twice daily x1 week.   He is to give me a call in a week to discuss preventative medication

## 2023-02-20 ENCOUNTER — VIRTUAL PHONE E/M (OUTPATIENT)
Dept: INTERNAL MEDICINE CLINIC | Facility: CLINIC | Age: 88
End: 2023-02-20
Payer: MEDICARE

## 2023-02-20 ENCOUNTER — TELEPHONE (OUTPATIENT)
Dept: INTERNAL MEDICINE CLINIC | Facility: CLINIC | Age: 88
End: 2023-02-20

## 2023-02-20 DIAGNOSIS — U07.1 COVID: Primary | ICD-10-CM

## 2023-02-22 ENCOUNTER — TELEPHONE (OUTPATIENT)
Dept: INTERNAL MEDICINE CLINIC | Facility: CLINIC | Age: 88
End: 2023-02-22

## 2023-02-22 NOTE — TELEPHONE ENCOUNTER
Patient stated that he has a cough still and fatigue. He stated he started Paxlovid 3 days ago. He is able to leave his apartment masked on 02/26/2023 and can go back to the dining [facility] on 03/03/2023. Will call tomorrow to follow up.

## 2023-03-16 ENCOUNTER — TELEPHONE (OUTPATIENT)
Dept: ORTHOPEDICS CLINIC | Facility: CLINIC | Age: 88
End: 2023-03-16

## 2023-03-16 DIAGNOSIS — M25.561 RIGHT KNEE PAIN, UNSPECIFIED CHRONICITY: Primary | ICD-10-CM

## 2023-03-16 NOTE — TELEPHONE ENCOUNTER
Patient called to request to start the process to receive a monovisc injection of the right knee. Patient's last OV with Dr. Angie Guillory was 2/2/23 at which the patient was advised to proceed with injection if cortisone showed no improvement. Patient was advised we will reach out when authorized and injection in office.  Patient can be reached [phone]

## 2023-03-16 NOTE — TELEPHONE ENCOUNTER
Patient requesting Kapil Woo Pending  STW:[birth year] 2/2/23 office visit note -  \" I did provide him with a Monovisc brochure for his review. We can certainly consider this if this cortisone injection provides no significant relief or if it is short-lived. It would have to be  from the cortisone injection by about 6 to 8 weeks. \"

## 2023-03-22 ENCOUNTER — TELEPHONE (OUTPATIENT)
Dept: ORTHOPEDICS CLINIC | Facility: CLINIC | Age: 88
End: 2023-03-22

## 2023-03-22 NOTE — TELEPHONE ENCOUNTER
Future Appointments   Date Time Provider Ramses Cielo   4/24/2023  9:30 AM Maureen Ceballos MD [facility]   4/27/2023 10:20 AM Armand Cardoza MD [facility] [id number]     Please interoffice to [facility] once med is received.

## 2023-04-24 ENCOUNTER — OFFICE VISIT (OUTPATIENT)
Dept: INTERNAL MEDICINE CLINIC | Facility: CLINIC | Age: 88
End: 2023-04-24
Payer: MEDICARE

## 2023-04-24 VITALS
SYSTOLIC BLOOD PRESSURE: 130 MMHG | WEIGHT: 198.63 LBS | HEART RATE: 60 BPM | OXYGEN SATURATION: 98 % | DIASTOLIC BLOOD PRESSURE: 82 MMHG | TEMPERATURE: 98 F | BODY MASS INDEX: 29 KG/M2

## 2023-04-24 DIAGNOSIS — I10 ESSENTIAL HYPERTENSION: ICD-10-CM

## 2023-04-24 DIAGNOSIS — E11.69 DM TYPE 2 WITH DIABETIC DYSLIPIDEMIA (HCC): Primary | ICD-10-CM

## 2023-04-24 DIAGNOSIS — E78.5 DM TYPE 2 WITH DIABETIC DYSLIPIDEMIA (HCC): Primary | ICD-10-CM

## 2023-04-24 PROCEDURE — 99214 OFFICE O/P EST MOD 30 MIN: CPT | Performed by: INTERNAL MEDICINE

## 2023-04-24 RX ORDER — GENTAMICIN SULFATE 3 MG/ML
1 SOLUTION/ DROPS OPHTHALMIC 4 TIMES DAILY
COMMUNITY
Start: 2023-04-03

## 2023-04-24 NOTE — PROGRESS NOTES
Is a very pleasant 80-year-old gentleman. Has a history of diabetes mellitus hypertension. Present labs at the [place]. Denies any polyuria dips or aphasia. Up-to-date eye exam.  He is blind in the right eye. Physical examination pleasant alert well orientated no acute distress normocephalic nonicteric carotids 1+ no bruits no thyromegaly or bruit. No cervical lymphadenopathy. Lungs clear to auscultation. Extremities no edema cyanosis or clubbing.     Impression 1 diabetes mellitus with chronic kidney disease patient is to have labs done at the [place] problem 2 hypertension well controlled

## 2023-04-27 ENCOUNTER — OFFICE VISIT (OUTPATIENT)
Dept: ORTHOPEDICS CLINIC | Facility: CLINIC | Age: 88
End: 2023-04-27
Payer: MEDICARE

## 2023-04-27 VITALS — WEIGHT: 198 LBS | BODY MASS INDEX: 26.82 KG/M2 | HEIGHT: 72 IN

## 2023-04-27 DIAGNOSIS — M25.561 CHRONIC PAIN OF RIGHT KNEE: ICD-10-CM

## 2023-04-27 DIAGNOSIS — G89.29 CHRONIC PAIN OF RIGHT KNEE: ICD-10-CM

## 2023-04-27 DIAGNOSIS — M17.11 PRIMARY OSTEOARTHRITIS OF RIGHT KNEE: Primary | ICD-10-CM

## 2023-04-27 PROCEDURE — 20610 DRAIN/INJ JOINT/BURSA W/O US: CPT | Performed by: ORTHOPAEDIC SURGERY

## 2023-04-27 PROCEDURE — 1125F AMNT PAIN NOTED PAIN PRSNT: CPT | Performed by: ORTHOPAEDIC SURGERY

## 2023-06-15 ENCOUNTER — TELEPHONE (OUTPATIENT)
Dept: INTERNAL MEDICINE CLINIC | Facility: CLINIC | Age: 88
End: 2023-06-15

## 2023-06-15 RX ORDER — PREDNISONE 20 MG/1
TABLET ORAL
Qty: 14 TABLET | Refills: 0 | Status: SHIPPED | OUTPATIENT
Start: 2023-06-15

## 2023-07-17 ENCOUNTER — OFFICE VISIT (OUTPATIENT)
Dept: INTERNAL MEDICINE CLINIC | Facility: CLINIC | Age: 88
End: 2023-07-17
Payer: MEDICARE

## 2023-07-17 VITALS
OXYGEN SATURATION: 99 % | RESPIRATION RATE: 18 BRPM | DIASTOLIC BLOOD PRESSURE: 72 MMHG | WEIGHT: 206.81 LBS | SYSTOLIC BLOOD PRESSURE: 136 MMHG | HEART RATE: 60 BPM | TEMPERATURE: 98 F | BODY MASS INDEX: 28 KG/M2

## 2023-07-17 DIAGNOSIS — E11.9 DIABETES MELLITUS TYPE 2 IN NONOBESE (HCC): ICD-10-CM

## 2023-07-17 DIAGNOSIS — M54.40 MIDLINE LOW BACK PAIN WITH SCIATICA, SCIATICA LATERALITY UNSPECIFIED, UNSPECIFIED CHRONICITY: ICD-10-CM

## 2023-07-17 DIAGNOSIS — B02.30 HERPES ZOSTER OPHTHALMICUS OF LEFT EYE: ICD-10-CM

## 2023-07-17 DIAGNOSIS — Z01.818 PRE-OP EXAM: Primary | ICD-10-CM

## 2023-07-17 DIAGNOSIS — E11.22 TYPE 2 DIABETES MELLITUS WITH STAGE 3B CHRONIC KIDNEY DISEASE, WITHOUT LONG-TERM CURRENT USE OF INSULIN (HCC): ICD-10-CM

## 2023-07-17 DIAGNOSIS — N18.32 TYPE 2 DIABETES MELLITUS WITH STAGE 3B CHRONIC KIDNEY DISEASE, WITHOUT LONG-TERM CURRENT USE OF INSULIN (HCC): ICD-10-CM

## 2023-07-17 DIAGNOSIS — I10 ESSENTIAL HYPERTENSION: ICD-10-CM

## 2023-07-17 LAB
ALBUMIN SERPL-MCNC: 3.4 G/DL (ref 3.4–5)
ALBUMIN/GLOB SERPL: 0.8 {RATIO} (ref 1–2)
ALP LIVER SERPL-CCNC: 79 U/L
ALT SERPL-CCNC: 32 U/L
ANION GAP SERPL CALC-SCNC: 5 MMOL/L (ref 0–18)
APTT PPP: 32.3 SECONDS (ref 23.3–35.6)
AST SERPL-CCNC: 30 U/L (ref 15–37)
ATRIAL RATE: 63 BPM
BASOPHILS # BLD AUTO: 0.04 X10(3) UL (ref 0–0.2)
BASOPHILS NFR BLD AUTO: 0.6 %
BILIRUB SERPL-MCNC: 0.4 MG/DL (ref 0.1–2)
BUN BLD-MCNC: 20 MG/DL (ref 7–18)
CALCIUM BLD-MCNC: 9.4 MG/DL (ref 8.5–10.1)
CHLORIDE SERPL-SCNC: 108 MMOL/L (ref 98–112)
CO2 SERPL-SCNC: 26 MMOL/L (ref 21–32)
CREAT BLD-MCNC: 1.56 MG/DL
EOSINOPHIL # BLD AUTO: 0.28 X10(3) UL (ref 0–0.7)
EOSINOPHIL NFR BLD AUTO: 4.3 %
ERYTHROCYTE [DISTWIDTH] IN BLOOD BY AUTOMATED COUNT: 13.5 %
EST. AVERAGE GLUCOSE BLD GHB EST-MCNC: 131 MG/DL (ref 68–126)
FASTING STATUS PATIENT QL REPORTED: NO
GFR SERPLBLD BASED ON 1.73 SQ M-ARVRAT: 42 ML/MIN/1.73M2 (ref 60–?)
GLOBULIN PLAS-MCNC: 4.5 G/DL (ref 2.8–4.4)
GLUCOSE BLD-MCNC: 141 MG/DL (ref 70–99)
HBA1C MFR BLD: 6.2 % (ref ?–5.7)
HCT VFR BLD AUTO: 40.5 %
HGB BLD-MCNC: 13.4 G/DL
IMM GRANULOCYTES # BLD AUTO: 0.03 X10(3) UL (ref 0–1)
IMM GRANULOCYTES NFR BLD: 0.5 %
LYMPHOCYTES # BLD AUTO: 1.46 X10(3) UL (ref 1–4)
LYMPHOCYTES NFR BLD AUTO: 22.7 %
MCH RBC QN AUTO: 32.1 PG (ref 26–34)
MCHC RBC AUTO-ENTMCNC: 33.1 G/DL (ref 31–37)
MCV RBC AUTO: 96.9 FL
MONOCYTES # BLD AUTO: 0.74 X10(3) UL (ref 0.1–1)
MONOCYTES NFR BLD AUTO: 11.5 %
NEUTROPHILS # BLD AUTO: 3.89 X10 (3) UL (ref 1.5–7.7)
NEUTROPHILS # BLD AUTO: 3.89 X10(3) UL (ref 1.5–7.7)
NEUTROPHILS NFR BLD AUTO: 60.4 %
OSMOLALITY SERPL CALC.SUM OF ELEC: 293 MOSM/KG (ref 275–295)
P AXIS: 45 DEGREES
P-R INTERVAL: 178 MS
PLATELET # BLD AUTO: 298 10(3)UL (ref 150–450)
POTASSIUM SERPL-SCNC: 4.2 MMOL/L (ref 3.5–5.1)
PROT SERPL-MCNC: 7.9 G/DL (ref 6.4–8.2)
Q-T INTERVAL: 378 MS
QRS DURATION: 66 MS
QTC CALCULATION (BEZET): 386 MS
R AXIS: 23 DEGREES
RBC # BLD AUTO: 4.18 X10(6)UL
SODIUM SERPL-SCNC: 139 MMOL/L (ref 136–145)
T AXIS: 58 DEGREES
VENTRICULAR RATE: 63 BPM
WBC # BLD AUTO: 6.4 X10(3) UL (ref 4–11)

## 2023-07-17 PROCEDURE — 83036 HEMOGLOBIN GLYCOSYLATED A1C: CPT | Performed by: INTERNAL MEDICINE

## 2023-07-17 PROCEDURE — 85025 COMPLETE CBC W/AUTO DIFF WBC: CPT | Performed by: INTERNAL MEDICINE

## 2023-07-17 PROCEDURE — 87086 URINE CULTURE/COLONY COUNT: CPT | Performed by: INTERNAL MEDICINE

## 2023-07-17 PROCEDURE — 80053 COMPREHEN METABOLIC PANEL: CPT | Performed by: INTERNAL MEDICINE

## 2023-07-17 PROCEDURE — 85730 THROMBOPLASTIN TIME PARTIAL: CPT | Performed by: INTERNAL MEDICINE

## 2023-07-20 ENCOUNTER — NURSE ONLY (OUTPATIENT)
Dept: INTERNAL MEDICINE CLINIC | Facility: CLINIC | Age: 88
End: 2023-07-20
Payer: MEDICARE

## 2023-07-20 ENCOUNTER — LAB ENCOUNTER (OUTPATIENT)
Dept: LAB | Age: 88
End: 2023-07-20
Attending: ORTHOPAEDIC SURGERY
Payer: MEDICARE

## 2023-07-20 DIAGNOSIS — Z01.818 PRE-OP TESTING: Primary | ICD-10-CM

## 2023-07-20 DIAGNOSIS — Z01.818 PRE-OP EXAM: Primary | ICD-10-CM

## 2023-07-20 LAB
APTT PPP: 30.7 SECONDS (ref 23.3–35.6)
BILIRUB UR QL STRIP.AUTO: NEGATIVE
CLARITY UR REFRACT.AUTO: CLEAR
GLUCOSE UR STRIP.AUTO-MCNC: NEGATIVE MG/DL
INR BLD: 1.03 (ref 0.85–1.16)
KETONES UR STRIP.AUTO-MCNC: NEGATIVE MG/DL
LEUKOCYTE ESTERASE UR QL STRIP.AUTO: NEGATIVE
NITRITE UR QL STRIP.AUTO: NEGATIVE
PH UR STRIP.AUTO: 7 [PH] (ref 5–8)
PROT UR STRIP.AUTO-MCNC: NEGATIVE MG/DL
PROTHROMBIN TIME: 13.5 SECONDS (ref 11.6–14.8)
RBC UR QL AUTO: NEGATIVE
SP GR UR STRIP.AUTO: 1.01 (ref 1–1.03)
UROBILINOGEN UR STRIP.AUTO-MCNC: <2 MG/DL

## 2023-07-20 PROCEDURE — 85610 PROTHROMBIN TIME: CPT

## 2023-07-20 PROCEDURE — 81003 URINALYSIS AUTO W/O SCOPE: CPT

## 2023-07-20 PROCEDURE — 36415 COLL VENOUS BLD VENIPUNCTURE: CPT

## 2023-07-20 PROCEDURE — 85730 THROMBOPLASTIN TIME PARTIAL: CPT

## 2023-08-07 ENCOUNTER — OFFICE VISIT (OUTPATIENT)
Dept: INTERNAL MEDICINE CLINIC | Facility: CLINIC | Age: 88
End: 2023-08-07
Payer: MEDICARE

## 2023-08-07 VITALS
DIASTOLIC BLOOD PRESSURE: 66 MMHG | OXYGEN SATURATION: 96 % | BODY MASS INDEX: 27 KG/M2 | HEART RATE: 60 BPM | TEMPERATURE: 98 F | WEIGHT: 201.81 LBS | SYSTOLIC BLOOD PRESSURE: 120 MMHG

## 2023-08-07 DIAGNOSIS — E11.22 TYPE 2 DIABETES MELLITUS WITH STAGE 3B CHRONIC KIDNEY DISEASE, WITHOUT LONG-TERM CURRENT USE OF INSULIN (HCC): ICD-10-CM

## 2023-08-07 DIAGNOSIS — N18.32 TYPE 2 DIABETES MELLITUS WITH STAGE 3B CHRONIC KIDNEY DISEASE, WITHOUT LONG-TERM CURRENT USE OF INSULIN (HCC): ICD-10-CM

## 2023-08-07 DIAGNOSIS — I10 ESSENTIAL HYPERTENSION: Primary | ICD-10-CM

## 2023-08-07 PROCEDURE — 99214 OFFICE O/P EST MOD 30 MIN: CPT | Performed by: INTERNAL MEDICINE

## 2023-08-07 NOTE — PROGRESS NOTES
This is a very pleasant 80-year-old gentleman. He is here with his friend following back surgery. States the pain is getting better. Was on Norco but hallucinating. Currently he is on Tylenol get relief. Does have an appointment to see his back surgeon end of the week. Does have a history of hypertension but he denies headaches dizziness chest pain shortness of breath. Also history of diabetes but denies polyuria polydipsia aphasia. We did have a long discussion with Chely Hanna regarding filling out the [facility] DNR form. This was completed. Physical examination pleasant alert well orientated no acute distress. Normocephalic nonicteric. Carotids 1+ no bruits no thyromegaly or bruit. Lungs clear to auscultation. Vascular system regular rate and rhythm extremities no edema cyanosis or clubbing. Impression status post back surgery patient to follow-up with surgeon end of the week. Problem 2 hypertension well controlled problem 3 diabetes mellitus recent A1c reviewed.

## 2023-08-22 NOTE — PROGRESS NOTES
Is a pleasant 80-year-old gentleman. Has a history of carpal tunnel syndrome or tarsal tunnel syndrome unclear. He has seen a surgeon for this but wants second opinion. He also has history of diabetes mellitus with chronic kidney disease stage IIIb and dyslipidemia and hypertension. Recently we added amlodipine to his medications. Blood pressures well controlled. He denies any headaches dizziness chest pain shortness of breath. He denies any polyuria dips or aphasia. Does not check home blood sugar. He does see his eye doctor once a year. Physical examination pleasant individual no acute distress normocephalic nonicteric carotids 1+ no bruits no thyromegaly or bruit. Lungs clear to auscultation. Cardiovascular system regular rate and rhythm. Extremities no edema cyanosis or clubbing. Impression carpal tunnel syndrome right hand versus tarsal tunnel syndrome. Patient is seeking second opinion problem 2 diabetes mellitus with chronic kidney disease check A1c #3 diabetes mellitus with dyslipidemia check lipid panel problem 4 hypertension well controlled. Refill for amlodipine was given to him physically so we can take to the [address] [facility] for refill. Skin Substitute Text: The defect edges were debeveled with a #15 scalpel blade. Given the location of the defect, shape of the defect and the proximity to free margins a skin substitute graft was deemed most appropriate.  The graft material was trimmed to fit the size of the defect. The graft was then placed in the primary defect and oriented appropriately.

## 2023-09-03 NOTE — TELEPHONE ENCOUNTER
Patient states that he is going on a cruise and leaves on 1/13/23, he states that he would like to know if Dr. Reuben Castillo would give him a Buffy Aaron in case he needs it. Please advise. Unable to answer due to medical condition/unresponsive/etc...

## 2023-10-12 ENCOUNTER — APPOINTMENT (OUTPATIENT)
Dept: GENERAL RADIOLOGY | Facility: HOSPITAL | Age: 88
End: 2023-10-12
Payer: MEDICARE

## 2023-10-12 ENCOUNTER — TELEPHONE (OUTPATIENT)
Dept: ORTHOPEDICS CLINIC | Facility: CLINIC | Age: 88
End: 2023-10-12

## 2023-10-12 ENCOUNTER — HOSPITAL ENCOUNTER (EMERGENCY)
Facility: HOSPITAL | Age: 88
Discharge: HOME | End: 2023-10-12
Attending: EMERGENCY MEDICINE
Payer: MEDICARE

## 2023-10-12 VITALS
SYSTOLIC BLOOD PRESSURE: 166 MMHG | BODY MASS INDEX: 29.92 KG/M2 | HEART RATE: 63 BPM | RESPIRATION RATE: 16 BRPM | WEIGHT: 202 LBS | TEMPERATURE: 98 F | HEIGHT: 69 IN | DIASTOLIC BLOOD PRESSURE: 85 MMHG | OXYGEN SATURATION: 97 %

## 2023-10-12 DIAGNOSIS — L03.116 CELLULITIS OF LEFT LOWER EXTREMITY: Primary | ICD-10-CM

## 2023-10-12 PROCEDURE — 99284 EMERGENCY DEPT VISIT MOD MDM: CPT

## 2023-10-12 PROCEDURE — 73560 X-RAY EXAM OF KNEE 1 OR 2: CPT

## 2023-10-12 PROCEDURE — 99283 EMERGENCY DEPT VISIT LOW MDM: CPT

## 2023-10-12 RX ORDER — ACETAMINOPHEN AND CODEINE PHOSPHATE 300; 30 MG/1; MG/1
1 TABLET ORAL EVERY 6 HOURS PRN
Qty: 10 TABLET | Refills: 0 | Status: SHIPPED | OUTPATIENT
Start: 2023-10-12 | End: 2023-10-17

## 2023-10-12 RX ORDER — AMOXICILLIN AND CLAVULANATE POTASSIUM 875; 125 MG/1; MG/1
1 TABLET, FILM COATED ORAL 2 TIMES DAILY
Qty: 20 TABLET | Refills: 0 | Status: SHIPPED | OUTPATIENT
Start: 2023-10-12 | End: 2023-10-22

## 2023-10-12 NOTE — TELEPHONE ENCOUNTER
Patient called for left knee pain, xray in epic, please advise for further imaging.   Future Appointments   Date Time Provider Ramses Buck   11/1/2023  2:20 PM Nathanael Paget, MD [facility] [id number]

## 2023-10-12 NOTE — TELEPHONE ENCOUNTER
Coming for left knee pain, last imaging 2/2/23. Would you like repeat imaging/ Please advise. Thank you! CONCLUSION:    Osteoarthritic changes are seen all 3 knee compartments, especially notable at the medial compartment where there is more severe joint narrowing, including osteophyte as well as sclerosis of the bone. More moderate degenerative changes   are present lateral and patellofemoral.  Slight degenerative lateral subluxation tibia with respect to the femur on the frontal view. No acute fractures seen. Lateral view shows small suprapatellar joint effusion and dystrophic calcifications anterior   and posterior knee. Partially visualized internal fixation shona distal femur.

## 2023-11-01 ENCOUNTER — OFFICE VISIT (OUTPATIENT)
Dept: ORTHOPEDICS CLINIC | Facility: CLINIC | Age: 88
End: 2023-11-01
Payer: MEDICARE

## 2023-11-01 VITALS — BODY MASS INDEX: 29.92 KG/M2 | WEIGHT: 202 LBS | HEIGHT: 69 IN

## 2023-11-01 DIAGNOSIS — M17.12 PRIMARY OSTEOARTHRITIS OF LEFT KNEE: Primary | ICD-10-CM

## 2023-11-01 PROCEDURE — 1125F AMNT PAIN NOTED PAIN PRSNT: CPT | Performed by: ORTHOPAEDIC SURGERY

## 2023-11-01 PROCEDURE — 99213 OFFICE O/P EST LOW 20 MIN: CPT | Performed by: ORTHOPAEDIC SURGERY

## 2023-11-01 RX ORDER — PREDNISONE 20 MG/1
20 TABLET ORAL DAILY
COMMUNITY
Start: 2023-10-17

## 2024-03-29 ENCOUNTER — HOSPITAL ENCOUNTER (OUTPATIENT)
Dept: CV DIAGNOSTICS | Age: 89
Discharge: HOME | End: 2024-03-29
Attending: HOSPITALIST
Payer: MEDICARE

## 2024-03-29 DIAGNOSIS — I10 ESSENTIAL HYPERTENSION: ICD-10-CM

## 2024-03-29 DIAGNOSIS — R00.2 PALPITATIONS: ICD-10-CM

## 2024-03-29 DIAGNOSIS — R94.31 ABNORMAL EKG: ICD-10-CM

## 2024-03-29 PROCEDURE — 93306 TTE W/DOPPLER COMPLETE: CPT | Performed by: HOSPITALIST

## 2024-03-30 ENCOUNTER — HOSPITAL ENCOUNTER (OUTPATIENT)
Dept: CV DIAGNOSTICS | Facility: HOSPITAL | Age: 89
Discharge: HOME | End: 2024-03-30
Attending: HOSPITALIST
Payer: MEDICARE

## 2024-03-30 DIAGNOSIS — R00.2 PALPITATIONS: ICD-10-CM

## 2024-03-30 DIAGNOSIS — R94.31 ABNORMAL EKG: ICD-10-CM

## 2024-03-30 DIAGNOSIS — I10 ESSENTIAL HYPERTENSION: ICD-10-CM

## 2024-03-30 PROCEDURE — 93243 EXT ECG>48HR<7D SCAN A/R: CPT | Performed by: HOSPITALIST

## 2024-03-30 PROCEDURE — 93242 EXT ECG>48HR<7D RECORDING: CPT | Performed by: HOSPITALIST

## 2024-10-10 ENCOUNTER — TELEPHONE (OUTPATIENT)
Dept: ORTHOPEDICS CLINIC | Facility: CLINIC | Age: 89
End: 2024-10-10

## 2024-10-10 DIAGNOSIS — M17.11 PRIMARY OSTEOARTHRITIS OF RIGHT KNEE: Primary | ICD-10-CM

## 2024-10-10 NOTE — TELEPHONE ENCOUNTER
Patient is requesting right knee Monovisc.  Order pending.  Thank you!      LOV 4/27/23  Right knee Monovisc

## 2024-10-10 NOTE — TELEPHONE ENCOUNTER
Patient authorized visco to be scheduled:    DOS: 10/23/24  PROVIDER: JACOB  MEDICATION: MONOVISC- RT KNEE  OFFICE LOCATION: [facility]  PULLED: 10/10/24  LABELED: 10/10/24  PLACED: 10/10/24   
Yes

## 2024-10-23 ENCOUNTER — OFFICE VISIT (OUTPATIENT)
Dept: ORTHOPEDICS CLINIC | Facility: CLINIC | Age: 89
End: 2024-10-23
Payer: MEDICARE

## 2024-10-23 VITALS — BODY MASS INDEX: 29.92 KG/M2 | HEIGHT: 69 IN | WEIGHT: 202 LBS

## 2024-10-23 DIAGNOSIS — M17.11 PRIMARY OSTEOARTHRITIS OF RIGHT KNEE: ICD-10-CM

## 2024-10-23 NOTE — PROGRESS NOTES
[facility] Orthopaedic Clinic Note    Chief Complaint   Patient presents with    Knee Pain     RIGHT KNEE MONOVISC INJECTION        HPI: The patient is a 89 year old male returning for orthopedic assessment with complaints of chronic right knee pain.  Symptoms have been ongoing for years related to osteoarthritis.  Pain is localized to the medial and anterior aspect of the right knee.  No recent injury is reported but the patient relates a history of IM nailing of the right femur from a fall while dancing a couple years ago.  Overall, his symptoms have escalated over the past couple of months with activities of daily living.  He depends on a rolling walker for fall prevention.  He is hopeful for symptomatic relief with an intra-articular viscosupplementation injection which has helped him in the past.  His most recent administration was in April 2023 with lasting relief.      Past Medical History:    Diabetes mellitus type 2 in nonobese (HCC)     Past Surgical History:   Procedure Laterality Date    Cataract      Cholecystectomy  1985    Colonoscopy      POLYPS X 1     Knee replacement surgery      Nephrectomy      Other surgical history  11/21/2019    cysto, trus- Dr Carlton    Other surgical history  12/11/2019    Urolift, Dr. Carlton    Other surgical history  11/30/2020    cysto dr carlton    Removal of eye Right 2012    Shoulder surg proc unlisted Right 1990s     Current Outpatient Medications   Medication Sig Dispense Refill    allopurinol 300 MG Oral Tab 1/2 tab q day 1 tablet 0    Lactobacillus (FLORAJEN ACIDOPHILUS) Oral Cap Take 1 tablet by mouth daily. 100 capsule 3    furosemide 20 MG Oral Tab Take 1 tablet (20 mg total) by mouth 2 (two) times daily. 180 tablet 3    Enalapril Maleate 20 MG Oral Tab Take 1 tablet (20 mg total) by mouth 2 (two) times daily.      omeprazole 20 MG Oral Capsule Delayed Release Take 1 capsule (20 mg total) by mouth every morning before breakfast.      Magnesium Oxide 420 MG  Oral Tab Take 1 tablet (420 mg total) by mouth 2 (two) times daily.      aspirin 81 MG Oral Tab Take 1 tablet (81 mg total) by mouth nightly.       Allergies[1]  Family History   Problem Relation Age of Onset    Other (Other) Father         AGE    Ulcerative Colitis Mother     Hypertension Mother     Other (Other) Mother         KIDNEY     Social History     Occupational History    Not on file   Tobacco Use    Smoking status: Former     Current packs/day: 0.00     Types: Cigarettes     Quit date: 1961     Years since quittin.8    Smokeless tobacco: Never    Tobacco comments:     QUIT 50 YRS. AGO   Substance and Sexual Activity    Alcohol use: Yes     Alcohol/week: 3.0 standard drinks of alcohol     Types: 2 Glasses of wine, 1 Shots of liquor per week    Drug use: No    Sexual activity: Not on file        ROS:  Complete ROS reviewed by me and non-contributory to the chief complaint except as mentioned above.    Physical Exam:    Ht 5' 9\" (1.753 m)   Wt 202 lb (91.6 kg)   BMI 29.83 kg/m²   Constitutional: Well developed, well nourished 89 year old male  Psychological: NAD, alert and appropriate  Respiratory: Breathing comfortably on room air with RR of 10-14  Cardiac: Palpable distal pulses with pink warm extremities distally  Examination of the knees reveals varus alignment and well-healed medial surgical scar.  There is no palpable effusion or warmth bilaterally.  Extensor mechanism is nontender to palpation at the insertions.  Intermittent click and crepitus is noted at the patellofemoral joint through an arc of motion estimated at 3-115 degrees.  Medial joint line is tender with moderate pain on Janna's and Rich's tests.  Ligaments are stable on varus valgus stress with no pain and solid endpoints.  Lachman and posterior drawer are negative.  Neurovascular status is intact on sensory, motor and perfusion assessment distally.      Imaging: Multiple views of the right knee from 2023 personally  viewed, demonstrating moderate to severe patellofemoral and medial compartment osteoarthritic changes.  The distal aspect of a intramedullary femoral nail is also identified..    No results found.      Assessment/Diagnoses:  Diagnoses and all orders for this visit:    Primary osteoarthritis of right knee      Plan:  I reviewed imaging and exam findings with the patient.  The diagnosis is recurrent pain from degenerative joint disease of the knee.  We discussed the etiology, natural history and treatment options in detail.  I reassured him that I do not feel that the femoral nail is contributing to his knee problems.  Treatments include activity modification, weight loss, anti-inflammatory use and possible injections.  In the long term, the patient may become a candidate for total knee arthroplasty, but only if symptoms become severe despite exhaustive non-operative care.  For now, non-surgical treatment options are recommended.  We discussed the option for repeat intra-articular viscosupplementation along with the associated risk benefits and alternatives.  Given minimal signs of synovitis or effusion, he would like to proceed.  Monovisc was provided in April 2023 with lasting benefit for greater than a year.  Unlike cortisone, benefit from viscosupplementation may take up to 4 weeks before noticeable improvement.  The patient expressed understanding and a desire to proceed.  All questions were answered and the patient verbalized understanding and appreciation.     Visco supplement Injection Procedure:  After discussing the risk benefits and alternatives to visco supplement injection including but not limited to needle infection, hypersensitivity reaction or failed improvement, the patient gave written and verbal consent to proceed.  Using meticulous sterile technique I injected 4 cc of 1% Xylocaine at the lateral patellofemoral joint of the right knee for local anesthesia.  After attempted aspiration, I injected  the entire contents of the Monovisc syringe through an 18-gauge needle to minimal resistance.  The patient tolerated this well, a Band-Aid was applied, and instructions were given to contact us with any adverse reactions.        Marisol Astudillo MD, [facility]  Orthopaedic Surgery   Sports Medicine/Knee and Shoulder  [facility]  t: [phone]  f: [phone]               This document was partially prepared using Dragon Medical voice recognition software.  Although every attempt is made to correct errors during dictation, discrepancies may still exist.                 [1]   Allergies  Allergen Reactions    Simvastatin ITCHING    Hydrocodone-Acetaminophen CONFUSION and HALLUCINATION    Levofloxacin INSOMNIA

## 2025-02-03 ENCOUNTER — TELEPHONE (OUTPATIENT)
Facility: CLINIC | Age: OVER 89
End: 2025-02-03

## 2025-02-03 DIAGNOSIS — M17.11 PRIMARY OSTEOARTHRITIS OF RIGHT KNEE: Primary | ICD-10-CM

## 2025-02-03 DIAGNOSIS — M17.12 PRIMARY OSTEOARTHRITIS OF LEFT KNEE: ICD-10-CM

## 2025-02-03 NOTE — TELEPHONE ENCOUNTER
Patient notified he is eligible for Monovisc for his right knee after 4/23/25    Patient calling to get gel injection PA for his right knee. Please advise once approved.       10/23/24  RIGHT KNEE MONOVISC INJECTION

## 2025-02-18 ENCOUNTER — OFFICE VISIT (OUTPATIENT)
Dept: NEPHROLOGY | Facility: CLINIC | Age: OVER 89
End: 2025-02-18
Payer: MEDICARE

## 2025-02-18 VITALS — BODY MASS INDEX: 30 KG/M2 | DIASTOLIC BLOOD PRESSURE: 64 MMHG | WEIGHT: 206.38 LBS | SYSTOLIC BLOOD PRESSURE: 152 MMHG

## 2025-02-18 DIAGNOSIS — N28.1 RENAL CYST: ICD-10-CM

## 2025-02-18 DIAGNOSIS — I10 PRIMARY HYPERTENSION: ICD-10-CM

## 2025-02-18 DIAGNOSIS — N18.30 STAGE 3 CHRONIC KIDNEY DISEASE, UNSPECIFIED WHETHER STAGE 3A OR 3B CKD (HCC): Primary | ICD-10-CM

## 2025-02-18 DIAGNOSIS — Z90.5 SOLITARY KIDNEY, ACQUIRED: ICD-10-CM

## 2025-02-18 DIAGNOSIS — K86.2 PANCREATIC CYST (HCC): ICD-10-CM

## 2025-02-18 PROCEDURE — 99204 OFFICE O/P NEW MOD 45 MIN: CPT | Performed by: INTERNAL MEDICINE

## 2025-02-18 NOTE — PROGRESS NOTES
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  Meds are benign without chronic NSAID or PPI use.  Urine sediment remains bland and does not suggest an underlying glomerulopathy.  Recent imaging without structural abnormalities.  Labs have been stable for years. PLAN- no further w/u; focus on BP mgmt.    2) R 1 cm renal cyst- of no clinical significance and is by definition benign. Will grow slowly and does not have malignant potential. No follow-up imaging needed.    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  Please see above for further details.    ROS:    Denies fever/chills  Denies wt loss/gain  Denies HA or visual changes  Denies CP or palpitations  Denies SOB/cough/hemoptysis  Denies abd or flank pain  Denies N/V/D  Denies change in urinary habits or gross hematuria  Denies LE edema  Denies skin rashes/myalgias/arthralgias    PMH:  Past Medical History:    Diabetes mellitus type 2 in nonobese (HCC)       PSH:  Past Surgical History:   Procedure Laterality Date    Cataract      Cholecystectomy  1985    Colonoscopy      POLYPS X 1     Knee replacement surgery      Nephrectomy      Other surgical history  11/21/2019    maryo, trus- Dr Mays    Other surgical history   2019    Urolift, Dr. Carlton    Other surgical history  2020    cysto dr carlton    Removal of eye Right     Shoulder surg proc unlisted Right 1990s       Medications (Active prior to today's visit):  Current Outpatient Medications   Medication Sig Dispense Refill    allopurinol 300 MG Oral Tab 1/2 tab q day 1 tablet 0    Lactobacillus (FLORAJEN ACIDOPHILUS) Oral Cap Take 1 tablet by mouth daily. 100 capsule 3    furosemide 20 MG Oral Tab Take 1 tablet (20 mg total) by mouth 2 (two) times daily. 180 tablet 3    Enalapril Maleate 20 MG Oral Tab Take 1 tablet (20 mg total) by mouth 2 (two) times daily.      omeprazole 20 MG Oral Capsule Delayed Release Take 1 capsule (20 mg total) by mouth every morning before breakfast.      Magnesium Oxide 420 MG Oral Tab Take 1 tablet (420 mg total) by mouth 2 (two) times daily.      aspirin 81 MG Oral Tab Take 1 tablet (81 mg total) by mouth nightly.         Allergies:  Allergies[1]    Social History:  Social History     Socioeconomic History    Marital status:    Tobacco Use    Smoking status: Former     Current packs/day: 0.00     Types: Cigarettes     Quit date: 1961     Years since quittin.1    Smokeless tobacco: Never    Tobacco comments:     QUIT 50 YRS. AGO   Substance and Sexual Activity    Alcohol use: Yes     Alcohol/week: 3.0 standard drinks of alcohol     Types: 2 Glasses of wine, 1 Shots of liquor per week    Drug use: No   Other Topics Concern    Caffeine Concern Yes     Comment: <1-2 daily    Exercise Yes     Comment: every day    Seat Belt Yes        Family History:  Denies family history of kidney disease.    PHYSICAL EXAM:   /64 (BP Location: Left arm, Patient Position: Sitting)   Wt 206 lb 6 oz (93.6 kg)   BMI 30.48 kg/m²    Wt Readings from Last 3 Encounters:   25 206 lb 6 oz (93.6 kg)   10/23/24 202 lb (91.6 kg)   23 202 lb (91.6 kg)     General: Alert and oriented in no apparent distress.  HEENT: No scleral  icterus, MMM  Neck: Supple, no EDITA or thyromegaly  Cardiac: Regular rate and rhythm, S1, S2 normal, no murmur or rub  Lungs: Clear without wheezes, rales, rhonchi.    Abdomen: Soft, non-tender. + bowel sounds, no palpable organomegaly  Extremities: Without clubbing, cyanosis or edema.  Neurologic:  normal affect, cranial nerves grossly intact, moving all extremities  Skin: Warm and dry, no rashes        Chong Diehl MD  2/18/2025  2:30 PM         [1]   Allergies  Allergen Reactions    Simvastatin ITCHING    Hydrocodone-Acetaminophen CONFUSION and HALLUCINATION    Levofloxacin INSOMNIA

## 2025-03-19 ENCOUNTER — TELEPHONE (OUTPATIENT)
Dept: SLEEP CENTER | Age: OVER 89
End: 2025-03-19

## 2025-03-20 ENCOUNTER — TELEPHONE (OUTPATIENT)
Dept: SLEEP CENTER | Age: OVER 89
End: 2025-03-20

## 2025-03-20 ENCOUNTER — TELEPHONE (OUTPATIENT)
Dept: ORTHOPEDICS CLINIC | Facility: CLINIC | Age: OVER 89
End: 2025-03-20

## 2025-03-20 NOTE — TELEPHONE ENCOUNTER
Patient is calling to start the process for the monovisc and he would like to know if he can get it in both knees. Please advise

## 2025-03-20 NOTE — TELEPHONE ENCOUNTER
March 20, 2025  Noni Erazo PA-C to [facility] Clinical Pool       3/20/25  9:56 AM  Order signed for katty knee monovisc to be administered after 4/23 thank  you

## 2025-03-20 NOTE — TELEPHONE ENCOUNTER
Patient is eligible for monovisc in  right knee after 4/23/25.  His is asking if he can also have a monovisc injection in his left knee which he has not gotten in the past.  Please advise.  Thank you!

## 2025-03-20 NOTE — TELEPHONE ENCOUNTER
Spoke with patient to inform him that the order was placed for bilateral Monovisc injections.  As soon as it is aurhtorized, someone will reach out to him to schedule, but it will have to be after 4/23/25.  Patient verbalized understanding.

## 2025-03-24 NOTE — TELEPHONE ENCOUNTER
Future Appointments   Date Time Provider Department Center   4/24/2025 10:00 AM [facility]isol [facility]studillo MD [id number]

## 2025-03-26 ENCOUNTER — ORDER TRANSCRIPTION (OUTPATIENT)
Dept: SLEEP CENTER | Age: OVER 89
End: 2025-03-26

## 2025-03-26 DIAGNOSIS — G47.33 OSA (OBSTRUCTIVE SLEEP APNEA): Primary | ICD-10-CM

## 2025-03-28 NOTE — TELEPHONE ENCOUNTER
Patient authorized visco to be scheduled:    DOS: 04/24/25  PROVIDER: JACOB  MEDICATION: MONOVISC- JAZMIN KNEE  OFFICE LOCATION: [facility]  PULLED: 04/01/25  LABELED: 04/01/25  PLACED: 04/01/25- POD 1 [facility]

## 2025-03-31 ENCOUNTER — OFFICE VISIT (OUTPATIENT)
Dept: SLEEP CENTER | Age: OVER 89
End: 2025-03-31
Attending: INTERNAL MEDICINE
Payer: MEDICARE

## 2025-03-31 DIAGNOSIS — G47.33 OSA (OBSTRUCTIVE SLEEP APNEA): ICD-10-CM

## 2025-03-31 PROCEDURE — 95806 SLEEP STUDY UNATT&RESP EFFT: CPT

## 2025-04-02 ENCOUNTER — SLEEP STUDY (OUTPATIENT)
Facility: CLINIC | Age: OVER 89
End: 2025-04-02
Payer: MEDICARE

## 2025-04-02 DIAGNOSIS — G47.30 SLEEP APNEA, UNSPECIFIED TYPE: Primary | ICD-10-CM

## 2025-04-02 PROCEDURE — 95806 SLEEP STUDY UNATT&RESP EFFT: CPT | Performed by: INTERNAL MEDICINE

## 2025-04-24 ENCOUNTER — OFFICE VISIT (OUTPATIENT)
Dept: ORTHOPEDICS CLINIC | Facility: CLINIC | Age: OVER 89
End: 2025-04-24
Payer: MEDICARE

## 2025-04-24 VITALS — BODY MASS INDEX: 30.51 KG/M2 | HEIGHT: 69 IN | WEIGHT: 206 LBS

## 2025-04-24 DIAGNOSIS — M17.0 PRIMARY OSTEOARTHRITIS OF BOTH KNEES: Primary | ICD-10-CM

## 2025-04-24 PROCEDURE — 20610 DRAIN/INJ JOINT/BURSA W/O US: CPT | Performed by: ORTHOPAEDIC SURGERY

## 2025-04-24 NOTE — PROGRESS NOTES
[facility] Orthopaedic Clinic Note    Chief Complaint   Patient presents with    Procedure     BILATERAL KNEE MONOVISC INJECTIONS       HPI: The patient is a 89 year old male returning for orthopedic assessment with complaints of chronic right knee pain.  Symptoms have been ongoing for years related to osteoarthritis.  Pain is localized to the medial and anterior aspect of the right knee.  No recent injury is reported but the patient relates a history of IM nailing of the right femur from a fall while dancing a couple years ago.  Overall, his symptoms have escalated over the past couple of months with activities of daily living.  He depends on a rolling walker for fall prevention.  He is hopeful for symptomatic relief with an intra-articular viscosupplementation injection which has helped him in the past.  His most recent administration was in April 2023 with lasting relief.      Past Medical History:    Diabetes mellitus type 2 in nonobese (HCC)     Past Surgical History:   Procedure Laterality Date    Cataract      Cholecystectomy  1985    Colonoscopy      POLYPS X 1     Knee replacement surgery      Nephrectomy      Other surgical history  11/21/2019    cysto, trus- Dr Carlton    Other surgical history  12/11/2019    Urolift, Dr. Carlton    Other surgical history  11/30/2020    cysto dr carlton    Removal of eye Right 2012    Shoulder surg proc unlisted Right 1990s     Current Outpatient Medications   Medication Sig Dispense Refill    allopurinol 300 MG Oral Tab 1/2 tab q day 1 tablet 0    Lactobacillus (FLORAJEN ACIDOPHILUS) Oral Cap Take 1 tablet by mouth daily. 100 capsule 3    Enalapril Maleate 20 MG Oral Tab Take 1 tablet (20 mg total) by mouth 2 (two) times daily.      omeprazole 20 MG Oral Capsule Delayed Release Take 1 capsule (20 mg total) by mouth every morning before breakfast.      Magnesium Oxide 420 MG Oral Tab Take 1 tablet (420 mg total) by mouth 2 (two) times daily.      aspirin 81 MG Oral Tab  Take 1 tablet (81 mg total) by mouth nightly.       Allergies[1]  Family History   Problem Relation Age of Onset    Other (Other) Father         AGE    Ulcerative Colitis Mother     Hypertension Mother     Other (Other) Mother         KIDNEY     Social History     Occupational History    Not on file   Tobacco Use    Smoking status: Former     Current packs/day: 0.00     Types: Cigarettes     Quit date: 1961     Years since quittin.3     Passive exposure: Never    Smokeless tobacco: Never    Tobacco comments:     QUIT 50 YRS. AGO   Vaping Use    Vaping status: Never Used   Substance and Sexual Activity    Alcohol use: Yes     Alcohol/week: 3.0 standard drinks of alcohol     Types: 2 Glasses of wine, 1 Shots of liquor per week    Drug use: No    Sexual activity: Not on file        ROS:  Complete ROS reviewed by me and non-contributory to the chief complaint except as mentioned above.    Physical Exam:    Ht 5' 9\" (1.753 m)   Wt 206 lb (93.4 kg)   BMI 30.42 kg/m²   Constitutional: Well developed, well nourished 89 year old male  Psychological: NAD, alert and appropriate  Respiratory: Breathing comfortably on room air with RR of 10-14  Cardiac: Palpable distal pulses with pink warm extremities distally  Examination of the knees reveals varus alignment more so on the right.  There is trace palpable effusion on the right, none on the left.  Extensor mechanism is nontender to palpation at the insertions.  Intermittent click and crepitus is noted at the patellofemoral joint through an arc of motion estimated at 3-115 degrees bilaterally.  Medial joint line is tender bilaterally with moderate pain on Janna's and Rich's tests.  Ligaments are stable bilaterally on varus valgus stress with no pain and solid endpoints.  Lachman and posterior drawer are negative.  Neurovascular status is intact on sensory, motor and perfusion assessment distally.      Imaging: Multiple views of the right knee from 2023  with comparisons to the left personally viewed, demonstrating moderate to severe patellofemoral and medial compartment osteoarthritic changes on the right with mild changes on the left.  The distal aspect of a intramedullary femoral nail is also identified.    No results found.      Assessment/Diagnoses:  Diagnoses and all orders for this visit:    Primary osteoarthritis of both knees  -     DRAIN/INJECT LARGE JOINT/BURSA  -     hyaluronan (Monovisc) 88 MG/4ML intra-articular injection 88 mg  -     hyaluronan (Monovisc) 88 MG/4ML intra-articular injection 88 mg      Plan:  I reviewed imaging and exam findings with the patient.  The diagnosis is recurrent pain from degenerative joint disease of the knees.  Although the right is more severe, he is also struggling on the left side.  Treatments include activity modification, weight loss, anti-inflammatory use and possible injections.  In the long term, the patient may become a candidate for total knee arthroplasty, but only if symptoms become severe despite exhaustive non-operative care.  For now, continued non-surgical treatment options are recommended.  We discussed the option for repeat intra-articular viscosupplementation to both knees given bilateral symptoms.  Monovisc was provided in October 2024 to the right knee with good tolerance and symptomatic relief.  Unlike cortisone, benefit from viscosupplementation may take up to 4 weeks before noticeable improvement.  The patient expressed understanding and a desire to proceed.  All questions were answered and the patient verbalized understanding and appreciation.     Visco supplement Injection Procedure:  After discussing the risk benefits and alternatives to visco supplement injection including but not limited to needle infection, hypersensitivity reaction or failed improvement, the patient gave written and verbal consent to proceed.  Using meticulous sterile technique, I injected 4 cc of 1% Xylocaine at the lateral  patellofemoral joint of the right and left knees in sequence for local anesthesia.  After aspiration of a small amount of benign-appearing synovial fluid only from the right knee, I injected the entire contents of the Monovisc syringe through an 18-gauge needle to minimal resistance to both knees in sequence.  The patient tolerated this well, a Band-Aid was applied to each knee, and instructions were given to contact us with any adverse reactions.        Marisol Astudillo MD, [facility]  Orthopaedic Surgery   Sports Medicine/Knee and [facility]  t: [phone]  f: [phone]               This document was partially prepared using Dragon Medical voice recognition software.  Although every attempt is made to correct errors during dictation, discrepancies may still exist.                 [1]   Allergies  Allergen Reactions    Simvastatin ITCHING    Hydrocodone-Acetaminophen CONFUSION and HALLUCINATION    Levofloxacin INSOMNIA

## (undated) DIAGNOSIS — G56.21 CUBITAL TUNNEL SYNDROME ON RIGHT: ICD-10-CM

## (undated) DIAGNOSIS — G56.01 CARPAL TUNNEL SYNDROME ON RIGHT: Primary | ICD-10-CM

## (undated) DIAGNOSIS — D47.2 MONOCLONAL GAMMOPATHY: Primary | ICD-10-CM

## (undated) NOTE — ED AVS SNAPSHOT
Concepcion Flores   MRN: [id number]    Department:  [facility] Emergency Department   Date of Visit:  5/27/2019           Disclosure     Insurance plans vary and the physician(s) referred by the ER may not be covered by your plan.  Please contact your tell this physician (or your personal doctor if your instructions are to return to your personal doctor) about any new or lasting problems. The primary care or specialist physician will see patients referred from the [facility] Emergency Department.  Jude Lloyd

## (undated) NOTE — Clinical Note
February 22, 2017    Ayan Kelley  [address] [facility]      Dear Avery Marrufo: The following are the results of your recent tests. Please review the list of test results.   Your result is the value on the left; we have also supplied t

## (undated) NOTE — MR AVS SNAPSHOT
[place] [address] [place] Jer[place] Wisdom [address] [place] [address] [phone]               Thank you for choosing us for your health care visit with Tucker Turk MD.  We are glad to serve you and happy to provide you with this 129/80 mmHg 62 97.3 °F (36.3 °C) (Tympanic) 217 lb 12.8 oz           Current Medications          This list is accurate as of: 5/25/17 10:18 AM.  Always use your most recent med list.                aspirin 81 MG Tabs   Take two tablets nightly Visit [facility] online at  [email or web address]

## (undated) NOTE — LETTER
HANK Notifier: [address]  B. Patient Name: Milady Garcia. Identification Number: [id number]    Advance Beneficiary Notice of Non-coverage (ABN)  NOTE: If Medicare doesn't pay for D. item/service(s) below, you may have to pay. Medicare does not pay for everything, even some care that you or your health care provider have good reason to think you need. We expect Medicare may not pay for the D. item/service(s) below. Nihcelle Bain Reason Medicare May Not Pay: F. Estimated Cost   ___Kenalog with Lidocaine and Marcaine   $320  ___Betamethasone with Lidocaine and/or Marcaine    $48  ___ Kenalog, Ketorolac, with Lidocaine and/or Marcaine   $370  ___Durolane      $7228  ___Euflexxa          $804  ___Hyalgan/Supartz  $0452  ___Orthovisc         $537  ___Synvisc   $1104  ___Synvisc One    $368  ___Monovisc         $1088  ___Zilretta        $856  ___Injection intra-articular $260  __  Medicare does not cover this service      __  Medicare may not pay for this   item/service for your condition     __  Medicare may not pay for this item/service as often as this          WHAT YOU NEED TO DO NOW:  Read this notice, so you can make an informed decision about your care. Ask us any questions that you may have after you finish reading. Choose an option below about whether to receive the D. item/service(s) listed above. Note: If you choose Option 1 or 2, we may help you to use any other insurance that you might have, but Medicare cannot require us to do this. G. OPTIONS: Check only one box. We cannot choose a box for you. OPTION 1. I want the D. item/service(s) listed above. You may ask to be paid now, but I also want Medicare billed for an official decision on payment, which is sent to me on a Medicare Summary Notice (MSN). I understand that if Medicare doesn't pay, I am responsible for payment, but I can appeal to Medicare by following the directions on the MSN.  If Medicare does pay, you will refund any payments I made to you, less co-pays or deductibles. OPTION 2. I want the D. item/service(s) listed above, but do not bill Medicare. You may ask to be paid now as I am responsible for payment. I cannot appeal if Medicare is not billed. OPTION 3. I don't want the D. item/service(s) listed above. I understand with this choice I am not responsible for payment, and I cannot appeal to see if Medicare would pay. H. Additional Information: This notice gives our opinion, not an official Medicare decision. If you have other questions on this notice or Medicare billing, call 1-800-MEDICARE (0-750.345.4736/IOO: [phone]). Signing below means that you have received and understand this notice. You may ask to receive a copy. I. Signature: J. Date:   You have the right to get Medicare information in an accessible format, like large print, Braille, or audio. You also have the right to file a complaint if you feel you've been discriminated against. Visit Medicare.gov/about- us/ulkbuvvmyifys-cujzioyinggzspwhc-jgcftq. According to the Paperwork Reduction Act of 1995, no persons are required to respond to a collection of information unless it displays a valid OMB control number. The valid OMB control number for this information collection is 3827-8102. The time required to complete this information collection is estimated to average 7 minutes per response, including the time to review instructions, search existing data resources, gather the data needed, and complete and review the information collection. If you have comments concerning the accuracy of the time estimate or suggestions for improving this form, please write to: CMS, [address], Attn: PRA [address] [place] [address].   Form CMS-R-131 (Exp.01/31/2026) Form Approved [facility] [id number]

## (undated) NOTE — MR AVS SNAPSHOT
[facility] [employer]  [address] [place] [address] [place] [address] [phone]               Thank you for choosing us for your health care visit with Urbano Corona MD.  We are glad to serve you and happy to provide you with this Commonly known as:  TRICOR           FREESTYLE LANCETS Misc   Test sugars once daily           Glucose Blood Strp   Test sugars once daily   Commonly known as:  FREESTYLE INSULINX TEST           Ipratropium Bromide 0.06 % Soln   2 sprays by Nasal route 2 (

## (undated) NOTE — MR AVS SNAPSHOT
[facility]  [address] [place]dom [address] [place] [id number] [phone]               Thank you for choosing us for your health care visit with Jack Osborn MD.  We are glad to serve you and happy to provide you with this Commonly known as:  TENORMIN           ciprofloxacin HCl 0.3 % Soln   Place 2 drops into the left eye every 2 (two) hours.    Commonly known as:  CILOXAN           Clobetasol Propionate 0.05 % Soln   APPLY TO ITCHY AREAS OF SCALP BID FOR UP TO 2 WEEKS THEN Phone:  [phone]    - amoxicillin 500 MG Caps  - Saline Nasal Spray 0.65 % Soln      You can get these medications from any pharmacy     Bring a paper prescription for each of these medications    - ciprofloxacin HCl 0.3 % Soln            [facility]

## (undated) NOTE — MR AVS SNAPSHOT
[facility] [address]  [address] [place] Wisdom [address] [place] [address] [phone]               Thank you for choosing us for your health care visit with Mehreen Loco MD.  We are glad to serve you and happy to provide you with this evaluate you. I want to make an appointment to see me on Monday.        Allergies as of Jun 08, 2017     Simvastatin Itching                Today's Vital Signs     BP Pulse Temp Weight          140/60 mmHg 64 100.7 °F (38.2 °C) (Tympanic) 223 lb 6.4 oz 1 spray by Nasal route as needed for congestion. Commonly known as:  SALINE MIST           Terazosin HCl 10 MG Caps   1 CAPSULE AT BEDTIME   Commonly known as:  HYTRIN           Vitamin D3 28491 units Caps   Take 1,000 Units by mouth daily.

## (undated) NOTE — LETTER
[address], [facility]   Date:   12/21/2021     Name:   Ray Howard    YOB: 1935   MRN:   [id number]       WHERE IS YOUR PAIN NOW?   Floyd the areas on your body where you feel the describ

## (undated) NOTE — LETTER
August 26, 2020    Ligia Garcia  [address] [place] [phone]      Dear Vikash Bower: The following are the results of your recent tests. Please review the list of test results.   Your result is the value on the left; we have also supplie

## (undated) NOTE — LETTER
07/02/20        Chester Christensen  [address] [place] [address]      Dear Shima Blackmon,    Our records indicate that you have outstanding lab work and or testing that was ordered for you and has not yet been completed:  Orders Placed This Encoun

## (undated) NOTE — MR AVS SNAPSHOT
[address] [facility]  [address] [place] [place] [address] [phone]               Thank you for choosing us for your health care visit with Jack Osborn MD.  We are glad to serve you and happy to provide you with this INDICATIONS AND SCHEDULE Internal Lab or Procedure External Lab or Procedure   Diabetes Screening      HbgA1C   Annually HEMOGLOBIN A1C (%)   Date Value   04/07/2015 6.3*   02/02/2009 6.5   04/14/2008 6.2*     HGBA1C (%)   Date Value   07/14/2016 6.5*    N taken 2.5 mg. Now take 2 tablets twice a day. I did give a prescription for 5 mg enalapril when she uses up to 2.5 did not take 1 of the 5 mg twice a day.   Make an appointment to see me in 1 month to check her blood pressure       Allergies as of Feb 21, Bring a paper prescription for each of these medications    - Enalapril Maleate 5 MG Tabs  - Fenofibrate 145 MG Tabs            Results of Recent Testing       MyChart               Educational Information     Healthy Diet and Regular Exercise  [facility]

## (undated) NOTE — LETTER
March 18, 2019    Syd Goddard  [address]      Dear Germaine Garcia: The following are the results of your recent tests. Please review the list of test results.   Your result is the value on the left; we have also supplied the

## (undated) NOTE — LETTER
November 1, 2021    Jacqueline Ren  [address] [place] [phone]      Dear Janes Arnett: The following are the results of your recent tests. Please review the list of test results.   Your result is the value on the left; we have also suppli SERUM MONOCLONAL PROTEIN STUDY   Result Value Ref Range    Total Protein 8.4 (H) 6.4 - 8.2 g/dL    Albumin 4.21 3.75 - 5.21 g/dL    Alpha-1-Globulins 0.34 0.19 - 0.46 g/dL    Alpha-2-Globulins 0.86 0.48 - 1.05 g/dL    Beta Globulins 0.89 0.68 - 1.23 g/dL

## (undated) NOTE — Clinical Note
June 9, 2017    Sudhir Lundy  [address] [place] [address]      Dear Heron Deshpande: The following are the results of your recent tests. Please review the list of test results.   Your result is the value on the left; we have also supplied the rang

## (undated) NOTE — MR AVS SNAPSHOT
[facility]  [address] [place] Wisdom [address] [place] [id number] [phone]               Thank you for choosing us for your health care visit with Jack Osborn MD.  We are glad to serve you and happy to provide you with this Take 1 tablet (145 mg total) by mouth daily.    Commonly known as:  TRICOR           FREESTYLE LANCETS Misc   Test sugars once daily           Glucose Blood Strp   Test sugars once daily   Commonly known as:  FREESTYLE INSULINX TEST           Ipratropium Br

## (undated) NOTE — LETTER
November 29, 2017    Chester Christensen  [address] [facility]      Dear Shima Blackmon: The following are the results of your recent tests. Please review the list of test results.   Your result is the value on the left; we have also supplied t MCHC 32.5 31.0 - 37.0 g/dL   RDW 14.0 11.5 - 16.0 %   RDW-SD 46.6 (H) 35.1 - 46.3 fL   Neutrophil Absolute Prelim 2.30 1.30 - 6.70 x10 (3) uL   Neutrophil Absolute 2.30 1.30 - 6.70 x10(3) uL   Lymphocyte Absolute 1.12 0.90 - 4.00 x10(3) uL   Monocyte Absol

## (undated) NOTE — Clinical Note
Pt does not have HFU appt scheduled at this time. He states he will call office back when he's ready to schedule. Thank you!

## (undated) NOTE — LETTER
May 29, 2020    Miguel Angel Bell  [address] [place] [phone]      Dear Alvaro Melara: The following are the results of your recent tests. Please review the list of test results.   Your result is the value on the left; we have also supplied t Result Value Ref Range    WBC 6.3 4.0 - 11.0 x10(3) uL    RBC 4.67 3.80 - 5.80 x10(6)uL    HGB 13.6 13.0 - 17.5 g/dL    HCT 41.8 39.0 - 53.0 %    .0 150.0 - 450.0 10(3)uL    MCV 89.5 80.0 - 100.0 fL    MCH 29.1 26.0 - 34.0 pg    MCHC 32.5 31.0 - 37.

## (undated) NOTE — LETTER
Bhupinder  [address] [facility] [address]dom [address] [facility] [address] [phone]                  5/24/18    Avery Marrufo,  As we discussed over phone your x-ray show signs of COPD.  Chronic obstructive pulmonary disease, damage m

## (undated) NOTE — LETTER
July 13, 2020    Stormy Gilliland  [address] [place] [phone]      Dear Werner Adams: The following are the results of your recent tests. Please review the list of test results.   Your result is the value on the left; we have also supplied

## (undated) NOTE — Clinical Note
May 26, 2017    Ilana Kaufman  [address]      Dear Elier Platt: The following are the results of your recent tests. Please review the list of test results.   Your result is the value on the left; we have also supplied the ra Lymphocyte Absolute 1.11 0.90-4.00 x10(3) uL   Monocyte Absolute 0.65 (H) 0.10-0.60 x10(3) uL   Eosinophil Absolute 0.36 (H) 0.00-0.30 x10(3) uL   Basophil Absolute 0.02 0.00-0.10 x10(3) uL   Immature Granulocyte Absolute 0.01 0.00-1.00 x10(3) uL   Neutrop

## (undated) NOTE — LETTER
October 27, 2021    Louis Poonam  [address] [place] [phone]      Dear Antonio Castaneda: The following are the results of your recent tests. Please review the list of test results.   Your result is the value on the left; we have also suppli

## (undated) NOTE — LETTER
November 4, 2019    Leidy Odor  [address] [facility]      Dear Licha Graft: The following are the results of your recent tests. Please review the list of test results.   Your result is the value on the left; we have also supplied th

## (undated) NOTE — LETTER
[place] [address] [address]  Phone: [phone]  Fax: [phone]     ELECTRODIAGNOSTIC REPORT            Patient: Fernando Sparks Hand Dominance:  right   Patient ID: [id number] Referring Dr:  Dr. Markel Diego results outside of the specified normal range in all 2 of the tested nerves:  · In the R Median - Digit II (Antidromic) study  · the peak latency was increased for Wrist stimulation  · the peak amplitude was reduced for Wrist stimulation  · the peak-to-pea DO  Physical Medicine and [address]  ________________________________         Motor NCS      Nerve / Sites Muscle Latency Amplitude Amp % Duration Segments Distance Lat Diff Velocity       ms mV % ms   cm ms m/s   R M N N N   R.  Abductor digiti minimi (manus) Ulnar C8-T1 N 2+ 2+ None None Normal N N N Reduced               Motor NCS Inching: R Ulnar - FDI